# Patient Record
Sex: FEMALE | Race: BLACK OR AFRICAN AMERICAN | NOT HISPANIC OR LATINO | Employment: FULL TIME | URBAN - METROPOLITAN AREA
[De-identification: names, ages, dates, MRNs, and addresses within clinical notes are randomized per-mention and may not be internally consistent; named-entity substitution may affect disease eponyms.]

---

## 2017-07-31 ENCOUNTER — HISTORICAL (OUTPATIENT)
Dept: LAB | Facility: HOSPITAL | Age: 42
End: 2017-07-31

## 2017-07-31 LAB
FT4I SERPL CALC-MCNC: 8 (ref 3–4)
T3RU NFR SERPL: 41 % (ref 31–39)
T4 SERPL-MCNC: 20.3 MCG/DL (ref 4.7–13.3)
TSH SERPL-ACNC: <0.007 MIU/ML (ref 0.35–3.75)

## 2017-08-02 ENCOUNTER — HISTORICAL (OUTPATIENT)
Dept: RADIOLOGY | Facility: HOSPITAL | Age: 42
End: 2017-08-02

## 2017-11-06 ENCOUNTER — HISTORICAL (OUTPATIENT)
Dept: LAB | Facility: HOSPITAL | Age: 42
End: 2017-11-06

## 2017-11-06 LAB
FT4I SERPL CALC-MCNC: 3 (ref 3–4)
T3RU NFR SERPL: 36 % (ref 31–39)
T4 SERPL-MCNC: 9.6 MCG/DL (ref 4.7–13.3)
TSH SERPL-ACNC: <0.007 MIU/ML (ref 0.35–3.75)

## 2017-11-27 ENCOUNTER — HISTORICAL (OUTPATIENT)
Dept: LAB | Facility: HOSPITAL | Age: 42
End: 2017-11-27

## 2017-11-27 LAB — T4 FREE SERPL-MCNC: 1.45 NG/DL (ref 0.76–1.46)

## 2017-12-19 ENCOUNTER — HISTORICAL (OUTPATIENT)
Dept: ANESTHESIOLOGY | Facility: HOSPITAL | Age: 42
End: 2017-12-19

## 2017-12-19 LAB — B-HCG SERPL QL: NEGATIVE

## 2018-01-04 ENCOUNTER — HISTORICAL (OUTPATIENT)
Dept: LAB | Facility: HOSPITAL | Age: 43
End: 2018-01-04

## 2018-01-04 LAB
T3RU NFR SERPL: 31 % (ref 31–39)
T4 FREE SERPL-MCNC: 1.21 NG/DL (ref 0.76–1.46)
TSH SERPL-ACNC: <0.007 MIU/ML (ref 0.35–3.75)

## 2018-03-20 ENCOUNTER — HISTORICAL (OUTPATIENT)
Dept: RADIOLOGY | Facility: HOSPITAL | Age: 43
End: 2018-03-20

## 2018-03-27 ENCOUNTER — HISTORICAL (OUTPATIENT)
Dept: ADMINISTRATIVE | Facility: HOSPITAL | Age: 43
End: 2018-03-27

## 2018-03-27 LAB
ABS NEUT (OLG): 1.5 X10(3)/MCL (ref 2.1–9.2)
ALBUMIN SERPL-MCNC: 3.9 GM/DL (ref 3.4–5)
ALBUMIN/GLOB SERPL: 1 RATIO (ref 1–2)
ALP SERPL-CCNC: 88 UNIT/L (ref 45–117)
ALT SERPL-CCNC: 31 UNIT/L (ref 12–78)
APPEARANCE, UA: CLEAR
AST SERPL-CCNC: 17 UNIT/L (ref 15–37)
BACTERIA #/AREA URNS AUTO: ABNORMAL /[HPF]
BASOPHILS # BLD AUTO: 0.02 X10(3)/MCL
BASOPHILS NFR BLD AUTO: 0 %
BILIRUB SERPL-MCNC: 0.3 MG/DL (ref 0.2–1)
BILIRUB UR QL STRIP: NEGATIVE
BILIRUBIN DIRECT+TOT PNL SERPL-MCNC: 0.1 MG/DL
BILIRUBIN DIRECT+TOT PNL SERPL-MCNC: 0.2 MG/DL
BUN SERPL-MCNC: 17 MG/DL (ref 7–18)
CALCIUM SERPL-MCNC: 9.3 MG/DL (ref 8.5–10.1)
CHLORIDE SERPL-SCNC: 109 MMOL/L (ref 98–107)
CHOLEST SERPL-MCNC: 132 MG/DL
CHOLEST/HDLC SERPL: 2.6 {RATIO} (ref 0–4.4)
CO2 SERPL-SCNC: 24 MMOL/L (ref 21–32)
COLOR UR: YELLOW
CREAT SERPL-MCNC: 0.4 MG/DL (ref 0.6–1.3)
EOSINOPHIL # BLD AUTO: 0.27 X10(3)/MCL
EOSINOPHIL NFR BLD AUTO: 7 %
ERYTHROCYTE [DISTWIDTH] IN BLOOD BY AUTOMATED COUNT: 14.5 % (ref 11.5–14.5)
EST. AVERAGE GLUCOSE BLD GHB EST-MCNC: 108 MG/DL
GLOBULIN SER-MCNC: 3.9 GM/ML (ref 2.3–3.5)
GLUCOSE (UA): NORMAL
GLUCOSE SERPL-MCNC: 92 MG/DL (ref 74–106)
HAV IGM SERPL QL IA: NONREACTIVE
HBA1C MFR BLD: 5.4 % (ref 4.2–6.3)
HBV CORE IGM SERPL QL IA: NONREACTIVE
HBV SURFACE AG SERPL QL IA: NEGATIVE
HCT VFR BLD AUTO: 37.1 % (ref 35–46)
HCV AB SERPL QL IA: NONREACTIVE
HDLC SERPL-MCNC: 50 MG/DL
HGB BLD-MCNC: 11.9 GM/DL (ref 12–16)
HGB UR QL STRIP: NEGATIVE
HIV 1+2 AB+HIV1 P24 AG SERPL QL IA: NONREACTIVE
HYALINE CASTS #/AREA URNS LPF: 0 /[LPF]
IMM GRANULOCYTES # BLD AUTO: 0.01 10*3/UL
IMM GRANULOCYTES NFR BLD AUTO: 0 %
KETONES UR QL STRIP: NEGATIVE
LDLC SERPL CALC-MCNC: 68 MG/DL (ref 0–130)
LEUKOCYTE ESTERASE UR QL STRIP: 75 LEU/UL
LYMPHOCYTES # BLD AUTO: 1.72 X10(3)/MCL
LYMPHOCYTES NFR BLD AUTO: 45 % (ref 13–40)
MCH RBC QN AUTO: 26.6 PG (ref 26–34)
MCHC RBC AUTO-ENTMCNC: 32.1 GM/DL (ref 31–37)
MCV RBC AUTO: 83 FL (ref 80–100)
MONOCYTES # BLD AUTO: 0.32 X10(3)/MCL
MONOCYTES NFR BLD AUTO: 8 % (ref 4–12)
MUCOUS THREADS URNS QL MICRO: ABNORMAL
NEUTROPHILS # BLD AUTO: 1.5 X10(3)/MCL
NEUTROPHILS NFR BLD AUTO: 39 X10(3)/MCL
NITRITE UR QL STRIP: NEGATIVE
PH UR STRIP: 6.5 [PH] (ref 4.5–8)
PLATELET # BLD AUTO: 350 X10(3)/MCL (ref 130–400)
PMV BLD AUTO: 10.2 FL (ref 7.4–10.4)
POTASSIUM SERPL-SCNC: 3.5 MMOL/L (ref 3.5–5.1)
PROT SERPL-MCNC: 7.8 GM/DL (ref 6.4–8.2)
PROT UR QL STRIP: 20 MG/DL
RBC # BLD AUTO: 4.47 X10(6)/MCL (ref 4–5.2)
RBC #/AREA URNS AUTO: ABNORMAL /[HPF]
SODIUM SERPL-SCNC: 141 MMOL/L (ref 136–145)
SP GR UR STRIP: 1.03 (ref 1–1.03)
SQUAMOUS #/AREA URNS LPF: ABNORMAL /[LPF]
T3FREE SERPL-MCNC: 9.47 PG/ML (ref 2.18–3.98)
T4 FREE SERPL-MCNC: 2.2 NG/DL (ref 0.76–1.46)
TRIGL SERPL-MCNC: 72 MG/DL
TSH SERPL-ACNC: <0.005 MIU/L (ref 0.36–3.74)
UROBILINOGEN UR STRIP-ACNC: 2 MG/DL
VLDLC SERPL CALC-MCNC: 14 MG/DL
WBC # SPEC AUTO: 3.8 X10(3)/MCL (ref 4.5–11)
WBC #/AREA URNS AUTO: ABNORMAL /HPF

## 2018-04-10 ENCOUNTER — HISTORICAL (OUTPATIENT)
Dept: INTERNAL MEDICINE | Facility: CLINIC | Age: 43
End: 2018-04-10

## 2018-04-10 LAB
T4 FREE SERPL-MCNC: 1.91 NG/DL (ref 0.76–1.46)
TSH SERPL-ACNC: <0.005 MIU/L (ref 0.36–3.74)

## 2018-04-19 ENCOUNTER — HISTORICAL (OUTPATIENT)
Dept: INTERNAL MEDICINE | Facility: CLINIC | Age: 43
End: 2018-04-19

## 2018-04-24 ENCOUNTER — HISTORICAL (OUTPATIENT)
Dept: RADIOLOGY | Facility: HOSPITAL | Age: 43
End: 2018-04-24

## 2018-06-27 ENCOUNTER — HISTORICAL (OUTPATIENT)
Dept: ADMINISTRATIVE | Facility: HOSPITAL | Age: 43
End: 2018-06-27

## 2018-06-27 LAB
APTT PPP: 30.7 SECOND(S) (ref 23.3–37)
BUN SERPL-MCNC: 11 MG/DL (ref 7–18)
CALCIUM SERPL-MCNC: 9.4 MG/DL (ref 8.5–10.1)
CHLORIDE SERPL-SCNC: 105 MMOL/L (ref 98–107)
CO2 SERPL-SCNC: 26 MMOL/L (ref 21–32)
CREAT SERPL-MCNC: 0.5 MG/DL (ref 0.6–1.3)
CREAT/UREA NIT SERPL: 22
ERYTHROCYTE [DISTWIDTH] IN BLOOD BY AUTOMATED COUNT: 14.5 % (ref 11.5–14.5)
GLUCOSE SERPL-MCNC: 89 MG/DL (ref 74–106)
HCT VFR BLD AUTO: 36.7 % (ref 35–46)
HGB BLD-MCNC: 11.7 GM/DL (ref 12–16)
INR PPP: 1.06 (ref 0.9–1.2)
MCH RBC QN AUTO: 26.3 PG (ref 26–34)
MCHC RBC AUTO-ENTMCNC: 31.9 GM/DL (ref 31–37)
MCV RBC AUTO: 82.5 FL (ref 80–100)
PLATELET # BLD AUTO: 370 X10(3)/MCL (ref 130–400)
PMV BLD AUTO: 10.7 FL (ref 7.4–10.4)
POTASSIUM SERPL-SCNC: 3.6 MMOL/L (ref 3.5–5.1)
PROTHROMBIN TIME: 13.1 SECOND(S) (ref 11.9–14.4)
RBC # BLD AUTO: 4.45 X10(6)/MCL (ref 4–5.2)
SODIUM SERPL-SCNC: 140 MMOL/L (ref 136–145)
T4 FREE SERPL-MCNC: 1.61 NG/DL (ref 0.76–1.46)
TSH SERPL-ACNC: <0.005 MIU/L (ref 0.36–3.74)
WBC # SPEC AUTO: 3.9 X10(3)/MCL (ref 4.5–11)

## 2018-06-29 ENCOUNTER — HOSPITAL ENCOUNTER (OUTPATIENT)
Dept: MEDSURG UNIT | Facility: HOSPITAL | Age: 43
End: 2018-06-30
Attending: OTOLARYNGOLOGY | Admitting: OTOLARYNGOLOGY

## 2018-06-29 LAB
ALBUMIN SERPL-MCNC: 3.5 GM/DL (ref 3.4–5)
ALBUMIN SERPL-MCNC: 3.5 GM/DL (ref 3.4–5)
ALBUMIN SERPL-MCNC: 3.8 GM/DL (ref 3.4–5)
B-HCG SERPL QL: NEGATIVE
CALCIUM SERPL-MCNC: 9.1 MG/DL (ref 8.5–10.1)
CALCIUM SERPL-MCNC: 9.2 MG/DL (ref 8.5–10.1)
CALCIUM SERPL-MCNC: 9.3 MG/DL (ref 8.5–10.1)
PTH-INTACT SERPL-MCNC: 37.9 PG/ML (ref 18.4–80.1)

## 2018-06-30 LAB
ALBUMIN SERPL-MCNC: 3.4 GM/DL (ref 3.4–5)
ALBUMIN SERPL-MCNC: 3.7 GM/DL (ref 3.4–5)
ALBUMIN/GLOB SERPL: 1 RATIO (ref 1–2)
ALP SERPL-CCNC: 89 UNIT/L (ref 45–117)
ALT SERPL-CCNC: 23 UNIT/L (ref 12–78)
AST SERPL-CCNC: 15 UNIT/L (ref 15–37)
BILIRUB SERPL-MCNC: 0.2 MG/DL (ref 0.2–1)
BILIRUBIN DIRECT+TOT PNL SERPL-MCNC: 0.1 MG/DL
BILIRUBIN DIRECT+TOT PNL SERPL-MCNC: 0.1 MG/DL
BUN SERPL-MCNC: 10 MG/DL (ref 7–18)
CALCIUM SERPL-MCNC: 8.9 MG/DL (ref 8.5–10.1)
CALCIUM SERPL-MCNC: 9.2 MG/DL (ref 8.5–10.1)
CHLORIDE SERPL-SCNC: 107 MMOL/L (ref 98–107)
CO2 SERPL-SCNC: 22 MMOL/L (ref 21–32)
CREAT SERPL-MCNC: 0.6 MG/DL (ref 0.6–1.3)
GLOBULIN SER-MCNC: 3.6 GM/ML (ref 2.3–3.5)
GLUCOSE SERPL-MCNC: 125 MG/DL (ref 74–106)
POTASSIUM SERPL-SCNC: 4.4 MMOL/L (ref 3.5–5.1)
PROT SERPL-MCNC: 7.3 GM/DL (ref 6.4–8.2)
SODIUM SERPL-SCNC: 138 MMOL/L (ref 136–145)

## 2018-07-31 ENCOUNTER — HISTORICAL (OUTPATIENT)
Dept: ADMINISTRATIVE | Facility: HOSPITAL | Age: 43
End: 2018-07-31

## 2018-07-31 LAB
T4 FREE SERPL-MCNC: 1.53 NG/DL (ref 0.76–1.46)
TSH SERPL-ACNC: <0.005 MIU/L (ref 0.36–3.74)

## 2018-08-28 ENCOUNTER — HISTORICAL (OUTPATIENT)
Dept: LAB | Facility: HOSPITAL | Age: 43
End: 2018-08-28

## 2018-08-28 LAB
T4 SERPL-MCNC: 9.4 MCG/DL (ref 4.7–13.3)
TSH SERPL-ACNC: <0.007 MIU/ML (ref 0.35–3.75)

## 2018-09-28 ENCOUNTER — HISTORICAL (OUTPATIENT)
Dept: ADMINISTRATIVE | Facility: HOSPITAL | Age: 43
End: 2018-09-28

## 2018-09-28 LAB
ABS NEUT (OLG): 1.64 X10(3)/MCL (ref 2.1–9.2)
ALBUMIN SERPL-MCNC: 4 GM/DL (ref 3.4–5)
ALBUMIN/GLOB SERPL: 1 RATIO (ref 1–2)
ALP SERPL-CCNC: 129 UNIT/L (ref 45–117)
ALT SERPL-CCNC: 16 UNIT/L (ref 12–78)
ANISOCYTOSIS BLD QL SMEAR: ABNORMAL
AST SERPL-CCNC: 12 UNIT/L (ref 15–37)
BASOPHILS NFR BLD MANUAL: 0 %
BILIRUB SERPL-MCNC: 0.4 MG/DL (ref 0.2–1)
BILIRUBIN DIRECT+TOT PNL SERPL-MCNC: 0.1 MG/DL
BILIRUBIN DIRECT+TOT PNL SERPL-MCNC: 0.3 MG/DL
BUN SERPL-MCNC: 16 MG/DL (ref 7–18)
CALCIUM SERPL-MCNC: 9.2 MG/DL (ref 8.5–10.1)
CHLORIDE SERPL-SCNC: 108 MMOL/L (ref 98–107)
CO2 SERPL-SCNC: 26 MMOL/L (ref 21–32)
CREAT SERPL-MCNC: 0.8 MG/DL (ref 0.6–1.3)
EOSINOPHIL NFR BLD MANUAL: 0 %
ERYTHROCYTE [DISTWIDTH] IN BLOOD BY AUTOMATED COUNT: 15 % (ref 11.5–14.5)
GLOBULIN SER-MCNC: 4.6 GM/ML (ref 2.3–3.5)
GLUCOSE SERPL-MCNC: 84 MG/DL (ref 74–106)
GRANULOCYTES NFR BLD MANUAL: 26 % (ref 43–75)
HCT VFR BLD AUTO: 39.3 % (ref 35–46)
HGB BLD-MCNC: 12.3 GM/DL (ref 12–16)
LYMPHOCYTES NFR BLD MANUAL: 62 % (ref 20.5–51.1)
MCH RBC QN AUTO: 25.1 PG (ref 26–34)
MCHC RBC AUTO-ENTMCNC: 31.3 GM/DL (ref 31–37)
MCV RBC AUTO: 80.2 FL (ref 80–100)
METAMYELOCYTES NFR BLD MANUAL: 2 %
MONOCYTES NFR BLD MANUAL: 8 % (ref 2–9)
NEUTS BAND NFR BLD MANUAL: 2 % (ref 0–10)
PLATELET # BLD AUTO: 446 X10(3)/MCL (ref 130–400)
PLATELET # BLD EST: ABNORMAL 10*3/UL
PMV BLD AUTO: 10.4 FL (ref 7.4–10.4)
POTASSIUM SERPL-SCNC: 3.7 MMOL/L (ref 3.5–5.1)
PROT SERPL-MCNC: 8.6 GM/DL (ref 6.4–8.2)
RBC # BLD AUTO: 4.9 X10(6)/MCL (ref 4–5.2)
RBC MORPH BLD: ABNORMAL
SODIUM SERPL-SCNC: 139 MMOL/L (ref 136–145)
T4 FREE SERPL-MCNC: 1.02 NG/DL (ref 0.76–1.46)
TSH SERPL-ACNC: <0.005 MIU/L (ref 0.36–3.74)
WBC # SPEC AUTO: 4.8 X10(3)/MCL (ref 4.5–11)

## 2018-11-12 ENCOUNTER — HISTORICAL (OUTPATIENT)
Dept: LAB | Facility: HOSPITAL | Age: 43
End: 2018-11-12

## 2018-11-12 LAB
ABS NEUT (OLG): 2.27
ALBUMIN SERPL-MCNC: 3.7 GM/DL (ref 3.4–5)
ALBUMIN/GLOB SERPL: 0.9 RATIO (ref 1.1–2)
ALP SERPL-CCNC: 103 UNIT/L (ref 46–116)
ALT SERPL-CCNC: 19 UNIT/L (ref 12–78)
AST SERPL-CCNC: 11 UNIT/L (ref 10–37)
BASOPHILS # BLD AUTO: 0.05 X10(3)/MCL
BASOPHILS NFR BLD AUTO: 1 %
BILIRUB SERPL-MCNC: 0.2 MG/DL (ref 0.2–1)
BILIRUBIN DIRECT+TOT PNL SERPL-MCNC: 0.06 MG/DL (ref 0–0.2)
BILIRUBIN DIRECT+TOT PNL SERPL-MCNC: 0.14 MG/DL
BUN SERPL-MCNC: 19 MG/DL (ref 7–18)
CALCIUM SERPL-MCNC: 8.7 MG/DL (ref 8.5–10.1)
CHLORIDE SERPL-SCNC: 104 MMOL/L (ref 98–107)
CO2 SERPL-SCNC: 23.8 MMOL/L (ref 21–32)
CREAT SERPL-MCNC: 0.87 MG/DL (ref 0.55–1.02)
CRP SERPL-MCNC: <0.3 MG/DL
EOSINOPHIL # BLD AUTO: 0.13 X10(3)/MCL
EOSINOPHIL NFR BLD AUTO: 2.5 %
ERYTHROCYTE [DISTWIDTH] IN BLOOD BY AUTOMATED COUNT: 17 %
ERYTHROCYTE [SEDIMENTATION RATE] IN BLOOD: 26 MM/HR (ref 0–20)
FOLATE SERPL-MCNC: 14.3 NG/ML (ref 3.1–17.5)
GLOBULIN SER-MCNC: 4.1 GM/DL (ref 2.4–3.5)
GLUCOSE SERPL-MCNC: 85 MG/DL (ref 74–106)
HCT VFR BLD AUTO: 34.8 % (ref 34–46)
HGB BLD-MCNC: 11.2 GM/DL (ref 11.3–15.4)
IMM GRANULOCYTES # BLD AUTO: 0.01 10*3/UL (ref 0–0.1)
IMM GRANULOCYTES NFR BLD AUTO: 0.2 % (ref 0–1)
IRON SATN MFR SERPL: 6 % (ref 20–50)
IRON SERPL-MCNC: 25 MCG/DL (ref 50–175)
LYMPHOCYTES # BLD AUTO: 2.43 X10(3)/MCL
LYMPHOCYTES NFR BLD AUTO: 46.6 %
MCH RBC QN AUTO: 25.6 PG (ref 27–33)
MCHC RBC AUTO-ENTMCNC: 32.2 GM/DL (ref 32–35)
MCV RBC AUTO: 79.5 FL (ref 81–97)
MONOCYTES # BLD AUTO: 0.33 X10(3)/MCL
MONOCYTES NFR BLD AUTO: 6.3 %
NEUTROPHILS # BLD AUTO: 2.27 X10(3)/MCL
NEUTROPHILS NFR BLD AUTO: 43.4 %
PLATELET # BLD AUTO: 440 X10(3)/MCL (ref 151–368)
PMV BLD AUTO: 10 FL
POTASSIUM SERPL-SCNC: 3.5 MMOL/L (ref 3.5–5.1)
PROT SERPL-MCNC: 7.8 GM/DL (ref 6.4–8.2)
RBC # BLD AUTO: 4.38 X10(6)/MCL (ref 3.9–5)
SODIUM SERPL-SCNC: 139 MMOL/L (ref 136–145)
TIBC SERPL-MCNC: 420 MCG/DL (ref 250–450)
TRANSFERRIN SERPL-MCNC: 335 MG/DL (ref 200–360)
TSH SERPL-ACNC: 0.2 MIU/ML (ref 0.35–3.75)
WBC # SPEC AUTO: 5.22 X10(3)/MCL (ref 3.4–9.2)

## 2019-01-30 ENCOUNTER — HISTORICAL (OUTPATIENT)
Dept: LAB | Facility: HOSPITAL | Age: 44
End: 2019-01-30

## 2019-01-30 LAB
ABS NEUT (OLG): 2.01
BASOPHILS # BLD AUTO: 0.07 X10(3)/MCL
BASOPHILS NFR BLD AUTO: 1.5 %
EOSINOPHIL # BLD AUTO: 0.11 X10(3)/MCL
EOSINOPHIL NFR BLD AUTO: 2.3 %
ERYTHROCYTE [DISTWIDTH] IN BLOOD BY AUTOMATED COUNT: 16 %
HCT VFR BLD AUTO: 36 % (ref 34–46)
HGB BLD-MCNC: 11 GM/DL (ref 11.3–15.4)
IMM GRANULOCYTES # BLD AUTO: 0.01 10*3/UL (ref 0–0.1)
IMM GRANULOCYTES NFR BLD AUTO: 0.2 % (ref 0–1)
LYMPHOCYTES # BLD AUTO: 2.15 X10(3)/MCL
LYMPHOCYTES NFR BLD AUTO: 45.1 %
MCH RBC QN AUTO: 24.8 PG (ref 27–33)
MCHC RBC AUTO-ENTMCNC: 30.6 GM/DL (ref 32–35)
MCV RBC AUTO: 81.3 FL (ref 81–97)
MONOCYTES # BLD AUTO: 0.42 X10(3)/MCL
MONOCYTES NFR BLD AUTO: 8.8 %
NEUTROPHILS # BLD AUTO: 2.01 X10(3)/MCL
NEUTROPHILS NFR BLD AUTO: 42.1 %
PLATELET # BLD AUTO: 409 X10(3)/MCL (ref 151–368)
PMV BLD AUTO: 10 FL
RBC # BLD AUTO: 4.43 X10(6)/MCL (ref 3.9–5)
TSH SERPL-ACNC: 11.17 MIU/ML (ref 0.35–3.75)
WBC # SPEC AUTO: 4.77 X10(3)/MCL (ref 3.4–9.2)

## 2019-04-15 ENCOUNTER — HISTORICAL (OUTPATIENT)
Dept: LAB | Facility: HOSPITAL | Age: 44
End: 2019-04-15

## 2019-04-15 LAB
FT4I SERPL CALC-MCNC: 2 (ref 3–4)
T3RU NFR SERPL: 31 % (ref 31–39)
T4 SERPL-MCNC: 5.7 MCG/DL (ref 4.7–13.3)
TSH SERPL-ACNC: 17.87 MIU/ML (ref 0.35–3.75)

## 2019-07-01 ENCOUNTER — HISTORICAL (OUTPATIENT)
Dept: LAB | Facility: HOSPITAL | Age: 44
End: 2019-07-01

## 2019-07-01 LAB
FT4I SERPL CALC-MCNC: 0 (ref 3–4)
T3RU NFR SERPL: 26 % (ref 31–39)
T4 SERPL-MCNC: 1 MCG/DL (ref 4.7–13.3)
TSH SERPL-ACNC: 84.95 MIU/ML (ref 0.35–3.75)

## 2019-08-02 ENCOUNTER — HISTORICAL (OUTPATIENT)
Dept: ADMINISTRATIVE | Facility: HOSPITAL | Age: 44
End: 2019-08-02

## 2019-08-02 LAB
ALBUMIN SERPL-MCNC: 4.2 GM/DL (ref 3.4–5)
BUN SERPL-MCNC: 12 MG/DL (ref 7–18)
CALCIUM SERPL-MCNC: 9 MG/DL (ref 8.5–10.1)
CHLORIDE SERPL-SCNC: 108 MMOL/L (ref 98–107)
CO2 SERPL-SCNC: 26 MMOL/L (ref 21–32)
CREAT SERPL-MCNC: 1 MG/DL (ref 0.6–1.3)
DEPRECATED CALCIDIOL+CALCIFEROL SERPL-MC: 13.39 NG/ML (ref 30–80)
GLUCOSE SERPL-MCNC: 83 MG/DL (ref 74–106)
PHOSPHATE SERPL-MCNC: 2.7 MG/DL (ref 2.5–4.9)
POTASSIUM SERPL-SCNC: 3.7 MMOL/L (ref 3.5–5.1)
PTH-INTACT SERPL-MCNC: 144.3 PG/ML (ref 18.4–80.1)
SODIUM SERPL-SCNC: 140 MMOL/L (ref 136–145)
T4 FREE SERPL-MCNC: 0.37 NG/DL (ref 0.76–1.46)
TSH SERPL-ACNC: 83.6 MIU/L (ref 0.36–3.74)

## 2019-08-20 ENCOUNTER — HISTORICAL (OUTPATIENT)
Dept: RADIOLOGY | Facility: HOSPITAL | Age: 44
End: 2019-08-20

## 2019-08-28 ENCOUNTER — HISTORICAL (OUTPATIENT)
Dept: RADIOLOGY | Facility: HOSPITAL | Age: 44
End: 2019-08-28

## 2019-12-17 ENCOUNTER — HISTORICAL (OUTPATIENT)
Dept: ADMINISTRATIVE | Facility: HOSPITAL | Age: 44
End: 2019-12-17

## 2019-12-17 LAB
ABS NEUT (OLG): 2.1 X10(3)/MCL (ref 2.1–9.2)
BASOPHILS # BLD AUTO: 0.1 X10(3)/MCL (ref 0–0.2)
BASOPHILS NFR BLD AUTO: 2 %
DEPRECATED CALCIDIOL+CALCIFEROL SERPL-MC: 12.47 NG/ML (ref 30–80)
EOSINOPHIL # BLD AUTO: 0.1 X10(3)/MCL (ref 0–0.9)
EOSINOPHIL NFR BLD AUTO: 3 %
ERYTHROCYTE [DISTWIDTH] IN BLOOD BY AUTOMATED COUNT: 17.6 % (ref 11.5–14.5)
FOLATE SERPL-MCNC: 11.8 NG/ML (ref 3.1–17.5)
HCT VFR BLD AUTO: 35.4 % (ref 35–46)
HGB BLD-MCNC: 10.5 GM/DL (ref 12–16)
IMM GRANULOCYTES # BLD AUTO: 0.01 10*3/UL
IMM GRANULOCYTES NFR BLD AUTO: 0 %
LYMPHOCYTES # BLD AUTO: 2 X10(3)/MCL (ref 0.6–4.6)
LYMPHOCYTES NFR BLD AUTO: 43 %
MCH RBC QN AUTO: 24.5 PG (ref 26–34)
MCHC RBC AUTO-ENTMCNC: 29.7 GM/DL (ref 31–37)
MCV RBC AUTO: 82.5 FL (ref 80–100)
MONOCYTES # BLD AUTO: 0.4 X10(3)/MCL (ref 0.1–1.3)
MONOCYTES NFR BLD AUTO: 8 %
NEUTROPHILS # BLD AUTO: 2.1 X10(3)/MCL (ref 2.1–9.2)
NEUTROPHILS NFR BLD AUTO: 44 %
PLATELET # BLD AUTO: 518 X10(3)/MCL (ref 130–400)
PMV BLD AUTO: 9.8 FL (ref 7.4–10.4)
RBC # BLD AUTO: 4.29 X10(6)/MCL (ref 4–5.2)
T4 FREE SERPL-MCNC: 0.73 NG/DL (ref 0.76–1.46)
TSH SERPL-ACNC: 25.2 MIU/L (ref 0.36–3.74)
VIT B12 SERPL-MCNC: 411 PG/ML (ref 193–986)
WBC # SPEC AUTO: 4.8 X10(3)/MCL (ref 4.5–11)

## 2020-03-03 ENCOUNTER — HISTORICAL (OUTPATIENT)
Dept: LAB | Facility: HOSPITAL | Age: 45
End: 2020-03-03

## 2020-03-03 LAB
ABS NEUT (OLG): 1.55
ALBUMIN SERPL-MCNC: 3.6 GM/DL (ref 3.5–5.2)
ALBUMIN/GLOB SERPL: 1 RATIO (ref 1.1–2)
ALP SERPL-CCNC: 52 UNIT/L (ref 40–150)
ALT SERPL-CCNC: 12 UNIT/L (ref 0–55)
AST SERPL-CCNC: 11 UNIT/L (ref 5–34)
BASOPHILS # BLD AUTO: 0.05 X10(3)/MCL
BASOPHILS NFR BLD AUTO: 1.3 %
BILIRUB SERPL-MCNC: 0.3 MG/DL
BILIRUBIN DIRECT+TOT PNL SERPL-MCNC: 0.1 MG/DL (ref 0–0.5)
BILIRUBIN DIRECT+TOT PNL SERPL-MCNC: 0.2 MG/DL
BUN SERPL-MCNC: 11 MG/DL (ref 7–18.7)
CALCIUM SERPL-MCNC: 8.8 MG/DL (ref 8.4–10.2)
CHLORIDE SERPL-SCNC: 110 MMOL/L (ref 98–107)
CHOLEST SERPL-MCNC: 183 MG/DL
CHOLEST/HDLC SERPL: 4 {RATIO} (ref 0–5)
CO2 SERPL-SCNC: 25 MEQ/L (ref 22–29)
CREAT SERPL-MCNC: 0.66 MG/DL (ref 0.55–1.02)
EOSINOPHIL # BLD AUTO: 0.15 X10(3)/MCL
EOSINOPHIL NFR BLD AUTO: 4 %
ERYTHROCYTE [DISTWIDTH] IN BLOOD BY AUTOMATED COUNT: 16 %
GLOBULIN SER-MCNC: 3.6 GM/DL (ref 2.4–3.5)
GLUCOSE SERPL-MCNC: 91 MG/DL (ref 74–100)
HCT VFR BLD AUTO: 32.5 % (ref 34–46)
HDLC SERPL-MCNC: 49 MG/DL
HGB BLD-MCNC: 9.7 GM/DL (ref 11.3–15.4)
IMM GRANULOCYTES # BLD AUTO: 0.01 10*3/UL (ref 0–0.1)
IMM GRANULOCYTES NFR BLD AUTO: 0.3 % (ref 0–1)
LDLC SERPL CALC-MCNC: 118 MG/DL (ref 50–140)
LYMPHOCYTES # BLD AUTO: 1.6 X10(3)/MCL
LYMPHOCYTES NFR BLD AUTO: 42.6 %
MCH RBC QN AUTO: 23 PG (ref 27–33)
MCHC RBC AUTO-ENTMCNC: 29.8 GM/DL (ref 32–35)
MCV RBC AUTO: 77 FL (ref 81–97)
MONOCYTES # BLD AUTO: 0.4 X10(3)/MCL
MONOCYTES NFR BLD AUTO: 10.6 %
NEUTROPHILS # BLD AUTO: 1.55 X10(3)/MCL
NEUTROPHILS NFR BLD AUTO: 41.2 %
PLATELET # BLD AUTO: 432 X10(3)/MCL (ref 140–450)
PMV BLD AUTO: 11 FL
POTASSIUM SERPL-SCNC: 3.4 MMOL/L (ref 3.5–5.1)
PROT SERPL-MCNC: 7.2 GM/DL (ref 6.4–8.3)
RBC # BLD AUTO: 4.22 X10(6)/MCL (ref 3.9–5)
SODIUM SERPL-SCNC: 143 MMOL/L (ref 136–145)
TRIGL SERPL-MCNC: 80 MG/DL (ref 37–140)
TSH SERPL-ACNC: 0.31 UIU/ML (ref 0.35–4.94)
VLDLC SERPL CALC-MCNC: 16 MG/DL
WBC # SPEC AUTO: 3.76 X10(3)/MCL (ref 3.4–9.2)

## 2020-05-21 ENCOUNTER — HISTORICAL (OUTPATIENT)
Dept: LAB | Facility: HOSPITAL | Age: 45
End: 2020-05-21

## 2020-05-21 LAB — TSH SERPL-ACNC: 0.09 UIU/ML (ref 0.35–4.94)

## 2020-08-18 ENCOUNTER — HISTORICAL (OUTPATIENT)
Dept: LAB | Facility: HOSPITAL | Age: 45
End: 2020-08-18

## 2020-08-18 LAB
ABS NEUT (OLG): 2.08
ANISOCYTOSIS BLD QL SMEAR: ABNORMAL
BUN SERPL-MCNC: 15 MG/DL (ref 7–18.7)
CALCIUM SERPL-MCNC: 10.2 MG/DL (ref 8.4–10.2)
CHLORIDE SERPL-SCNC: 107 MMOL/L (ref 98–107)
CO2 SERPL-SCNC: 22 MEQ/L (ref 22–29)
CREAT SERPL-MCNC: 0.76 MG/DL (ref 0.55–1.02)
CREAT/UREA NIT SERPL: 20
EOSINOPHIL NFR BLD MANUAL: 2 % (ref 0–8)
ERYTHROCYTE [DISTWIDTH] IN BLOOD BY AUTOMATED COUNT: 19 %
GLUCOSE SERPL-MCNC: 80 MG/DL (ref 74–100)
GRANULOCYTES NFR BLD MANUAL: 40 % (ref 47–80)
HCT VFR BLD AUTO: 35.9 % (ref 34–46)
HGB BLD-MCNC: 11.2 GM/DL (ref 11.3–15.4)
HYPOCHROMIA BLD QL SMEAR: ABNORMAL
LYMPHOCYTES NFR BLD MANUAL: 49 % (ref 13–40)
MCH RBC QN AUTO: 23.2 PG (ref 27–33)
MCHC RBC AUTO-ENTMCNC: 31.2 GM/DL (ref 32–35)
MCV RBC AUTO: 74.5 FL (ref 81–97)
MICROCYTES BLD QL SMEAR: ABNORMAL
MONOCYTES NFR BLD MANUAL: 9 % (ref 2–11)
PLATELET # BLD AUTO: 541 X10(3)/MCL (ref 140–450)
PLATELET # BLD EST: ABNORMAL 10*3/UL
PMV BLD AUTO: 10 FL
POTASSIUM SERPL-SCNC: 4.4 MMOL/L (ref 3.5–5.1)
RBC # BLD AUTO: 4.82 X10(6)/MCL (ref 3.9–5)
SODIUM SERPL-SCNC: 142 MMOL/L (ref 136–145)
T4 FREE SERPL-MCNC: 0.67 NG/DL (ref 0.7–1.48)
TSH SERPL-ACNC: 0.22 UIU/ML (ref 0.35–4.94)
WBC # SPEC AUTO: 5.21 X10(3)/MCL (ref 3.4–9.2)

## 2020-09-16 ENCOUNTER — HISTORICAL (OUTPATIENT)
Dept: LAB | Facility: HOSPITAL | Age: 45
End: 2020-09-16

## 2020-09-16 LAB
ABS NEUT (OLG): 2.67
BASOPHILS # BLD AUTO: 0.07 X10(3)/MCL
BASOPHILS NFR BLD AUTO: 1.3 %
EOSINOPHIL # BLD AUTO: 0.23 X10(3)/MCL
EOSINOPHIL NFR BLD AUTO: 4.2 %
ERYTHROCYTE [DISTWIDTH] IN BLOOD BY AUTOMATED COUNT: 18 %
HCT VFR BLD AUTO: 31.8 % (ref 34–46)
HGB BLD-MCNC: 9.8 GM/DL (ref 11.3–15.4)
IMM GRANULOCYTES # BLD AUTO: 0.01 10*3/UL (ref 0–0.1)
IMM GRANULOCYTES NFR BLD AUTO: 0.2 % (ref 0–1)
LYMPHOCYTES # BLD AUTO: 2.05 X10(3)/MCL
LYMPHOCYTES NFR BLD AUTO: 37.4 %
MCH RBC QN AUTO: 23.6 PG (ref 27–33)
MCHC RBC AUTO-ENTMCNC: 30.8 GM/DL (ref 32–35)
MCV RBC AUTO: 76.6 FL (ref 81–97)
MONOCYTES # BLD AUTO: 0.45 X10(3)/MCL
MONOCYTES NFR BLD AUTO: 8.2 %
NEUTROPHILS # BLD AUTO: 2.67 X10(3)/MCL
NEUTROPHILS NFR BLD AUTO: 48.7 %
PLATELET # BLD AUTO: 477 X10(3)/MCL (ref 140–450)
PMV BLD AUTO: 10 FL
RBC # BLD AUTO: 4.15 X10(6)/MCL (ref 3.9–5)
T3RU NFR SERPL: 34.2 % (ref 31–39)
T4 FREE SERPL-MCNC: 1.22 NG/DL (ref 0.7–1.48)
TSH SERPL-ACNC: 0.02 UIU/ML (ref 0.35–4.94)
WBC # SPEC AUTO: 5.48 X10(3)/MCL (ref 3.4–9.2)

## 2020-09-21 ENCOUNTER — HISTORICAL (OUTPATIENT)
Dept: RADIOLOGY | Facility: HOSPITAL | Age: 45
End: 2020-09-21

## 2020-09-28 LAB
HIGH RISK HPV 16 (PRECISION): NEGATIVE
HIGH RISK HPV 18/45 (PRECISION): NORMAL
PAP RECOMMENDATION EXT: NORMAL
PAP SMEAR: NORMAL

## 2020-10-13 ENCOUNTER — HISTORICAL (OUTPATIENT)
Dept: ADMINISTRATIVE | Facility: HOSPITAL | Age: 45
End: 2020-10-13

## 2020-10-13 LAB
ABS NEUT (OLG): 1.6 X10(3)/MCL (ref 1.5–6.9)
ALBUMIN SERPL-MCNC: 3.8 GM/DL (ref 3.5–5)
ALBUMIN/GLOB SERPL: 1 RATIO (ref 1.1–2)
ALP SERPL-CCNC: 62 UNIT/L (ref 40–150)
ALT SERPL-CCNC: 7 UNIT/L (ref 0–55)
AST SERPL-CCNC: 14 UNIT/L (ref 5–34)
BASOPHILS NFR BLD MANUAL: 0 % (ref 0–1)
BILIRUB SERPL-MCNC: 0.2 MG/DL
BILIRUBIN DIRECT+TOT PNL SERPL-MCNC: 0.1 MG/DL (ref 0–0.5)
BILIRUBIN DIRECT+TOT PNL SERPL-MCNC: 0.1 MG/DL (ref 0–0.8)
BUN SERPL-MCNC: 11 MG/DL (ref 7–18.7)
CALCIUM SERPL-MCNC: 9.3 MG/DL (ref 8.4–10.2)
CHLORIDE SERPL-SCNC: 107 MMOL/L (ref 98–107)
CO2 SERPL-SCNC: 27 MMOL/L (ref 22–29)
CREAT SERPL-MCNC: 0.68 MG/DL (ref 0.55–1.02)
EOSINOPHIL NFR BLD MANUAL: 5 % (ref 0–5)
ERYTHROCYTE [DISTWIDTH] IN BLOOD BY AUTOMATED COUNT: 16.7 % (ref 11.5–17)
FERRITIN SERPL-MCNC: 4.67 NG/ML (ref 4.63–204)
GLOBULIN SER-MCNC: 3.9 GM/DL (ref 2.4–3.5)
GLUCOSE SERPL-MCNC: 87 MG/DL (ref 74–100)
GRANULOCYTES NFR BLD MANUAL: 41 % (ref 47–80)
HCT VFR BLD AUTO: 34.5 % (ref 36–48)
HGB BLD-MCNC: 10.3 GM/DL (ref 12–16)
HYPOCHROMIA BLD QL SMEAR: ABNORMAL
IRON SATN MFR SERPL: 5 % (ref 20–50)
IRON SERPL-MCNC: 19 UG/DL (ref 50–170)
LYMPHOCYTES NFR BLD MANUAL: 46 % (ref 15–40)
MCH RBC QN AUTO: 24 PG (ref 27–34)
MCHC RBC AUTO-ENTMCNC: 30 GM/DL (ref 31–36)
MCV RBC AUTO: 80 FL (ref 80–99)
MONOCYTES NFR BLD MANUAL: 8 % (ref 4–12)
PLATELET # BLD AUTO: 462 X10(3)/MCL (ref 140–400)
PLATELET # BLD EST: ABNORMAL 10*3/UL
PMV BLD AUTO: 9.7 FL (ref 6.8–10)
POTASSIUM SERPL-SCNC: 3.6 MMOL/L (ref 3.5–5.1)
PROT SERPL-MCNC: 7.7 GM/DL (ref 6.4–8.3)
RBC # BLD AUTO: 4.31 X10(6)/MCL (ref 4.2–5.4)
RBC MORPH BLD: ABNORMAL
SODIUM SERPL-SCNC: 141 MMOL/L (ref 136–145)
TIBC SERPL-MCNC: 355 UG/DL (ref 70–310)
TIBC SERPL-MCNC: 374 UG/DL (ref 250–450)
TSH SERPL-ACNC: 0.02 UIU/ML (ref 0.35–4.94)
WBC # SPEC AUTO: 3.9 X10(3)/MCL (ref 4.5–11.5)

## 2020-10-19 ENCOUNTER — HISTORICAL (OUTPATIENT)
Dept: INFUSION THERAPY | Facility: HOSPITAL | Age: 45
End: 2020-10-19

## 2020-10-26 ENCOUNTER — HISTORICAL (OUTPATIENT)
Dept: INFUSION THERAPY | Facility: HOSPITAL | Age: 45
End: 2020-10-26

## 2020-11-23 ENCOUNTER — HISTORICAL (OUTPATIENT)
Dept: RADIOLOGY | Facility: HOSPITAL | Age: 45
End: 2020-11-23

## 2020-12-04 ENCOUNTER — HISTORICAL (OUTPATIENT)
Dept: LAB | Facility: HOSPITAL | Age: 45
End: 2020-12-04

## 2020-12-04 LAB
ABS NEUT (OLG): 1.56
ALBUMIN SERPL-MCNC: 4.5 GM/DL (ref 3.5–5)
ALBUMIN/GLOB SERPL: 1.2 RATIO (ref 1.1–2)
ALP SERPL-CCNC: 60 UNIT/L (ref 40–150)
ALT SERPL-CCNC: 16 UNIT/L (ref 0–55)
AST SERPL-CCNC: 17 UNIT/L (ref 5–34)
BASOPHILS # BLD AUTO: 0.02 X10(3)/MCL
BASOPHILS NFR BLD AUTO: 0.5 %
BILIRUB SERPL-MCNC: 0.5 MG/DL
BILIRUBIN DIRECT+TOT PNL SERPL-MCNC: 0.2 MG/DL (ref 0–0.5)
BILIRUBIN DIRECT+TOT PNL SERPL-MCNC: 0.3 MG/DL
BUN SERPL-MCNC: 20 MG/DL (ref 7–18.7)
CALCIUM SERPL-MCNC: 9.7 MG/DL (ref 8.4–10.2)
CHLORIDE SERPL-SCNC: 106 MMOL/L (ref 98–107)
CO2 SERPL-SCNC: 23 MEQ/L (ref 22–29)
CREAT SERPL-MCNC: 0.81 MG/DL (ref 0.55–1.02)
EOSINOPHIL # BLD AUTO: 0.05 X10(3)/MCL
EOSINOPHIL NFR BLD AUTO: 1.4 %
ERYTHROCYTE [DISTWIDTH] IN BLOOD BY AUTOMATED COUNT: 21 %
GLOBULIN SER-MCNC: 3.8 GM/DL (ref 2.4–3.5)
GLUCOSE SERPL-MCNC: 84 MG/DL (ref 74–100)
HCT VFR BLD AUTO: 44.5 % (ref 34–46)
HGB BLD-MCNC: 14.8 GM/DL (ref 11.3–15.4)
IMM GRANULOCYTES # BLD AUTO: 0 10*3/UL (ref 0–0.1)
IMM GRANULOCYTES NFR BLD AUTO: 0 % (ref 0–1)
LYMPHOCYTES # BLD AUTO: 1.71 X10(3)/MCL
LYMPHOCYTES NFR BLD AUTO: 46.5 %
MCH RBC QN AUTO: 27.8 PG (ref 27–33)
MCHC RBC AUTO-ENTMCNC: 33.3 GM/DL (ref 32–35)
MCV RBC AUTO: 83.5 FL (ref 81–97)
MONOCYTES # BLD AUTO: 0.34 X10(3)/MCL
MONOCYTES NFR BLD AUTO: 9.2 %
NEUTROPHILS # BLD AUTO: 1.56 X10(3)/MCL
NEUTROPHILS NFR BLD AUTO: 42.4 %
PLATELET # BLD AUTO: 399 X10(3)/MCL (ref 140–450)
PMV BLD AUTO: 10 FL
POTASSIUM SERPL-SCNC: 3.6 MMOL/L (ref 3.5–5.1)
PROT SERPL-MCNC: 8.3 GM/DL (ref 6.4–8.3)
RBC # BLD AUTO: 5.33 X10(6)/MCL (ref 3.9–5)
SODIUM SERPL-SCNC: 139 MMOL/L (ref 136–145)
TSH SERPL-ACNC: 4.73 UIU/ML (ref 0.35–4.94)
WBC # SPEC AUTO: 3.68 X10(3)/MCL (ref 3.4–9.2)

## 2020-12-07 ENCOUNTER — HISTORICAL (OUTPATIENT)
Dept: LAB | Facility: HOSPITAL | Age: 45
End: 2020-12-07

## 2020-12-07 LAB
FERRITIN SERPL-MCNC: 96.81 NG/ML (ref 4.63–204)
IRON SATN MFR SERPL: 26 % (ref 20–50)
IRON SERPL-MCNC: 75 UG/DL (ref 50–170)
TIBC SERPL-MCNC: 218 UG/DL (ref 70–310)
TIBC SERPL-MCNC: 293 UG/DL (ref 250–450)
TRANSFERRIN SERPL-MCNC: 238 MG/DL (ref 180–382)

## 2021-03-10 ENCOUNTER — HISTORICAL (OUTPATIENT)
Dept: LAB | Facility: HOSPITAL | Age: 46
End: 2021-03-10

## 2021-03-10 LAB
ABS NEUT (OLG): 2.38
ALBUMIN SERPL-MCNC: 4.4 GM/DL (ref 3.5–5)
ALBUMIN/GLOB SERPL: 1.3 RATIO (ref 1.1–2)
ALP SERPL-CCNC: 60 UNIT/L (ref 40–150)
ALT SERPL-CCNC: 16 UNIT/L (ref 0–55)
AST SERPL-CCNC: 16 UNIT/L (ref 5–34)
BASOPHILS # BLD AUTO: 0.05 X10(3)/MCL
BASOPHILS NFR BLD AUTO: 1 %
BILIRUB SERPL-MCNC: 0.5 MG/DL
BILIRUBIN DIRECT+TOT PNL SERPL-MCNC: 0.2 MG/DL (ref 0–0.5)
BILIRUBIN DIRECT+TOT PNL SERPL-MCNC: 0.3 MG/DL
BUN SERPL-MCNC: 13 MG/DL (ref 7–18.7)
CALCIUM SERPL-MCNC: 9.7 MG/DL (ref 8.4–10.2)
CHLORIDE SERPL-SCNC: 107 MMOL/L (ref 98–107)
CO2 SERPL-SCNC: 25 MEQ/L (ref 22–29)
CREAT SERPL-MCNC: 0.81 MG/DL (ref 0.55–1.02)
EOSINOPHIL # BLD AUTO: 0.1 X10(3)/MCL
EOSINOPHIL NFR BLD AUTO: 2.1 %
ERYTHROCYTE [DISTWIDTH] IN BLOOD BY AUTOMATED COUNT: 14 %
FERRITIN SERPL-MCNC: 17.29 NG/ML (ref 4.63–204)
GLOBULIN SER-MCNC: 3.3 GM/DL (ref 2.4–3.5)
GLUCOSE SERPL-MCNC: 92 MG/DL (ref 74–100)
HCT VFR BLD AUTO: 41.9 % (ref 34–46)
HGB BLD-MCNC: 13.9 GM/DL (ref 11.3–15.4)
IMM GRANULOCYTES # BLD AUTO: 0.01 10*3/UL (ref 0–0.1)
IMM GRANULOCYTES NFR BLD AUTO: 0.2 % (ref 0–1)
IRON SATN MFR SERPL: 25 % (ref 20–50)
IRON SERPL-MCNC: 80 UG/DL (ref 50–170)
LYMPHOCYTES # BLD AUTO: 1.95 X10(3)/MCL
LYMPHOCYTES NFR BLD AUTO: 40.5 %
MCH RBC QN AUTO: 30.2 PG (ref 27–33)
MCHC RBC AUTO-ENTMCNC: 33.2 GM/DL (ref 32–35)
MCV RBC AUTO: 90.9 FL (ref 81–97)
MONOCYTES # BLD AUTO: 0.33 X10(3)/MCL
MONOCYTES NFR BLD AUTO: 6.8 %
NEUTROPHILS # BLD AUTO: 2.38 X10(3)/MCL
NEUTROPHILS NFR BLD AUTO: 49.4 %
PLATELET # BLD AUTO: 434 X10(3)/MCL (ref 140–450)
PMV BLD AUTO: 10 FL
POTASSIUM SERPL-SCNC: 3.5 MMOL/L (ref 3.5–5.1)
PROT SERPL-MCNC: 7.7 GM/DL (ref 6.4–8.3)
RBC # BLD AUTO: 4.61 X10(6)/MCL (ref 3.9–5)
SODIUM SERPL-SCNC: 139 MMOL/L (ref 136–145)
TIBC SERPL-MCNC: 238 UG/DL (ref 70–310)
TIBC SERPL-MCNC: 318 UG/DL (ref 250–450)
TRANSFERRIN SERPL-MCNC: 282 MG/DL (ref 180–382)
WBC # SPEC AUTO: 4.82 X10(3)/MCL (ref 3.4–9.2)

## 2021-05-14 ENCOUNTER — HISTORICAL (OUTPATIENT)
Dept: LAB | Facility: HOSPITAL | Age: 46
End: 2021-05-14

## 2021-05-14 LAB
ABS NEUT (OLG): 1.69
ALBUMIN SERPL-MCNC: 4.2 GM/DL (ref 3.5–5)
ALBUMIN/GLOB SERPL: 1.1 RATIO (ref 1.1–2)
ALP SERPL-CCNC: 56 UNIT/L (ref 40–150)
ALT SERPL-CCNC: 14 UNIT/L (ref 0–55)
ANISOCYTOSIS BLD QL SMEAR: SLIGHT
AST SERPL-CCNC: 20 UNIT/L (ref 5–34)
BASOPHILS NFR BLD MANUAL: 2 % (ref 0–2)
BILIRUB SERPL-MCNC: 0.2 MG/DL
BILIRUBIN DIRECT+TOT PNL SERPL-MCNC: 0.1 MG/DL
BILIRUBIN DIRECT+TOT PNL SERPL-MCNC: 0.1 MG/DL (ref 0–0.5)
BUN SERPL-MCNC: 13 MG/DL (ref 7–18.7)
CALCIUM SERPL-MCNC: 9.6 MG/DL (ref 8.4–10.2)
CHLORIDE SERPL-SCNC: 104 MMOL/L (ref 98–107)
CO2 SERPL-SCNC: 28 MEQ/L (ref 22–29)
CREAT SERPL-MCNC: 0.81 MG/DL (ref 0.55–1.02)
EOSINOPHIL NFR BLD MANUAL: 2 % (ref 0–8)
ERYTHROCYTE [DISTWIDTH] IN BLOOD BY AUTOMATED COUNT: 13 %
FERRITIN SERPL-MCNC: 10.37 NG/ML (ref 4.63–204)
GLOBULIN SER-MCNC: 3.8 GM/DL (ref 2.4–3.5)
GLUCOSE SERPL-MCNC: 96 MG/DL (ref 74–100)
GRANULOCYTES NFR BLD MANUAL: 43 % (ref 47–80)
HCT VFR BLD AUTO: 41.3 % (ref 34–46)
HGB BLD-MCNC: 13.6 GM/DL (ref 11.3–15.4)
HYPOCHROMIA BLD QL SMEAR: ABNORMAL
IRON SATN MFR SERPL: 21 % (ref 20–50)
IRON SERPL-MCNC: 73 UG/DL (ref 50–170)
LYMPHOCYTES NFR BLD MANUAL: 46 % (ref 13–40)
MCH RBC QN AUTO: 29.8 PG (ref 27–33)
MCHC RBC AUTO-ENTMCNC: 32.9 GM/DL (ref 32–35)
MCV RBC AUTO: 90.6 FL (ref 81–97)
MONOCYTES NFR BLD MANUAL: 7 % (ref 2–11)
PLATELET # BLD AUTO: 358 X10(3)/MCL (ref 140–450)
PLATELET # BLD EST: NORMAL 10*3/UL
PMV BLD AUTO: 10 FL
POTASSIUM SERPL-SCNC: 4.2 MMOL/L (ref 3.5–5.1)
PROT SERPL-MCNC: 8 GM/DL (ref 6.4–8.3)
RBC # BLD AUTO: 4.56 X10(6)/MCL (ref 3.9–5)
SODIUM SERPL-SCNC: 141 MMOL/L (ref 136–145)
TIBC SERPL-MCNC: 279 UG/DL (ref 70–310)
TIBC SERPL-MCNC: 352 UG/DL (ref 250–450)
TRANSFERRIN SERPL-MCNC: 307 MG/DL (ref 180–382)
WBC # SPEC AUTO: 4.35 X10(3)/MCL (ref 3.4–9.2)

## 2021-07-27 ENCOUNTER — HISTORICAL (OUTPATIENT)
Dept: ADMINISTRATIVE | Facility: HOSPITAL | Age: 46
End: 2021-07-27

## 2021-07-27 LAB
ABS NEUT (OLG): 2.77 X10(3)/MCL (ref 2.1–9.2)
ALBUMIN SERPL-MCNC: 4.1 GM/DL (ref 3.5–5)
ALBUMIN/GLOB SERPL: 1.3 RATIO (ref 1.1–2)
ALP SERPL-CCNC: 59 UNIT/L (ref 40–150)
ALT SERPL-CCNC: 13 UNIT/L (ref 0–55)
AST SERPL-CCNC: 16 UNIT/L (ref 5–34)
BASOPHILS # BLD AUTO: 0 X10(3)/MCL (ref 0–0.2)
BASOPHILS NFR BLD AUTO: 1 %
BILIRUB SERPL-MCNC: 0.3 MG/DL
BILIRUBIN DIRECT+TOT PNL SERPL-MCNC: 0.1 MG/DL (ref 0–0.5)
BILIRUBIN DIRECT+TOT PNL SERPL-MCNC: 0.2 MG/DL (ref 0–0.8)
BUN SERPL-MCNC: 14.3 MG/DL (ref 7–18.7)
CALCIUM SERPL-MCNC: 9.7 MG/DL (ref 8.4–10.2)
CHLORIDE SERPL-SCNC: 104 MMOL/L (ref 98–107)
CHOLEST SERPL-MCNC: 199 MG/DL
CHOLEST/HDLC SERPL: 4 {RATIO} (ref 0–5)
CO2 SERPL-SCNC: 27 MMOL/L (ref 22–29)
CREAT SERPL-MCNC: 0.71 MG/DL (ref 0.55–1.02)
EOSINOPHIL # BLD AUTO: 0.1 X10(3)/MCL (ref 0–0.9)
EOSINOPHIL NFR BLD AUTO: 2 %
ERYTHROCYTE [DISTWIDTH] IN BLOOD BY AUTOMATED COUNT: 13.6 % (ref 11.5–14.5)
EST. AVERAGE GLUCOSE BLD GHB EST-MCNC: 99.7 MG/DL
GLOBULIN SER-MCNC: 3.2 GM/DL (ref 2.4–3.5)
GLUCOSE SERPL-MCNC: 92 MG/DL (ref 74–100)
HBA1C MFR BLD: 5.1 %
HCT VFR BLD AUTO: 39 % (ref 35–46)
HDLC SERPL-MCNC: 55 MG/DL (ref 35–60)
HGB BLD-MCNC: 12.8 GM/DL (ref 12–16)
IMM GRANULOCYTES # BLD AUTO: 0.02 10*3/UL
IMM GRANULOCYTES NFR BLD AUTO: 0 %
LDLC SERPL CALC-MCNC: 122 MG/DL (ref 50–140)
LYMPHOCYTES # BLD AUTO: 2.3 X10(3)/MCL (ref 0.6–4.6)
LYMPHOCYTES NFR BLD AUTO: 39 %
MCH RBC QN AUTO: 30 PG (ref 26–34)
MCHC RBC AUTO-ENTMCNC: 32.8 GM/DL (ref 31–37)
MCV RBC AUTO: 91.5 FL (ref 80–100)
MONOCYTES # BLD AUTO: 0.6 X10(3)/MCL (ref 0.1–1.3)
MONOCYTES NFR BLD AUTO: 10 %
NEUTROPHILS # BLD AUTO: 2.77 X10(3)/MCL (ref 2.1–9.2)
NEUTROPHILS NFR BLD AUTO: 48 %
NRBC BLD AUTO-RTO: 0 % (ref 0–0.2)
PLATELET # BLD AUTO: 402 X10(3)/MCL (ref 130–400)
PMV BLD AUTO: 10 FL (ref 7.4–10.4)
POTASSIUM SERPL-SCNC: 4 MMOL/L (ref 3.5–5.1)
PROT SERPL-MCNC: 7.3 GM/DL (ref 6.4–8.3)
RBC # BLD AUTO: 4.26 X10(6)/MCL (ref 4–5.2)
SODIUM SERPL-SCNC: 139 MMOL/L (ref 136–145)
TRIGL SERPL-MCNC: 108 MG/DL (ref 37–140)
TSH SERPL-ACNC: 0.9 UIU/ML (ref 0.35–4.94)
VLDLC SERPL CALC-MCNC: 22 MG/DL
WBC # SPEC AUTO: 5.8 X10(3)/MCL (ref 4.5–11)

## 2021-08-25 ENCOUNTER — HISTORICAL (OUTPATIENT)
Dept: LAB | Facility: HOSPITAL | Age: 46
End: 2021-08-25

## 2021-08-25 LAB
ABS NEUT (OLG): 2.04
ALBUMIN SERPL-MCNC: 3.9 GM/DL (ref 3.5–5)
ALBUMIN/GLOB SERPL: 1.1 RATIO (ref 1.1–2)
ALP SERPL-CCNC: 59 UNIT/L (ref 40–150)
ALT SERPL-CCNC: 14 UNIT/L (ref 0–55)
AST SERPL-CCNC: 13 UNIT/L (ref 5–34)
BASOPHILS # BLD AUTO: 0.07 X10(3)/MCL
BASOPHILS NFR BLD AUTO: 1.5 %
BILIRUB SERPL-MCNC: 0.5 MG/DL
BILIRUBIN DIRECT+TOT PNL SERPL-MCNC: 0.2 MG/DL (ref 0–0.5)
BILIRUBIN DIRECT+TOT PNL SERPL-MCNC: 0.3 MG/DL
BUN SERPL-MCNC: 17 MG/DL (ref 7–18.7)
CALCIUM SERPL-MCNC: 9.6 MG/DL (ref 8.4–10.2)
CHLORIDE SERPL-SCNC: 106 MMOL/L (ref 98–107)
CO2 SERPL-SCNC: 24 MEQ/L (ref 22–29)
CREAT SERPL-MCNC: 0.76 MG/DL (ref 0.55–1.02)
EOSINOPHIL # BLD AUTO: 0.1 X10(3)/MCL
EOSINOPHIL NFR BLD AUTO: 2.2 %
ERYTHROCYTE [DISTWIDTH] IN BLOOD BY AUTOMATED COUNT: 14 %
FERRITIN SERPL-MCNC: 15.88 NG/ML (ref 4.63–204)
GLOBULIN SER-MCNC: 3.6 GM/DL (ref 2.4–3.5)
GLUCOSE SERPL-MCNC: 104 MG/DL (ref 74–100)
HCT VFR BLD AUTO: 37.2 % (ref 34–46)
HGB BLD-MCNC: 12 GM/DL (ref 11.3–15.4)
IMM GRANULOCYTES # BLD AUTO: 0.01 10*3/UL (ref 0–0.1)
IMM GRANULOCYTES NFR BLD AUTO: 0.2 % (ref 0–1)
IRON SATN MFR SERPL: 17 % (ref 20–50)
IRON SERPL-MCNC: 58 UG/DL (ref 50–170)
LYMPHOCYTES # BLD AUTO: 1.97 X10(3)/MCL
LYMPHOCYTES NFR BLD AUTO: 43.5 %
MCH RBC QN AUTO: 28.9 PG (ref 27–33)
MCHC RBC AUTO-ENTMCNC: 32.3 GM/DL (ref 32–35)
MCV RBC AUTO: 89.6 FL (ref 81–97)
MONOCYTES # BLD AUTO: 0.34 X10(3)/MCL
MONOCYTES NFR BLD AUTO: 7.5 %
NEUTROPHILS # BLD AUTO: 2.04 X10(3)/MCL
NEUTROPHILS NFR BLD AUTO: 45.1 %
PLATELET # BLD AUTO: 529 X10(3)/MCL (ref 140–450)
PMV BLD AUTO: 9 FL
POTASSIUM SERPL-SCNC: 3.6 MMOL/L (ref 3.5–5.1)
PROT SERPL-MCNC: 7.5 GM/DL (ref 6.4–8.3)
RBC # BLD AUTO: 4.15 X10(6)/MCL (ref 3.9–5)
SODIUM SERPL-SCNC: 140 MMOL/L (ref 136–145)
TIBC SERPL-MCNC: 274 UG/DL (ref 70–310)
TIBC SERPL-MCNC: 332 UG/DL (ref 250–450)
TRANSFERRIN SERPL-MCNC: 310 MG/DL (ref 180–382)
WBC # SPEC AUTO: 4.53 X10(3)/MCL (ref 3.4–9.2)

## 2021-09-23 ENCOUNTER — HISTORICAL (OUTPATIENT)
Dept: INFUSION THERAPY | Facility: HOSPITAL | Age: 46
End: 2021-09-23

## 2021-09-30 ENCOUNTER — HISTORICAL (OUTPATIENT)
Dept: INFUSION THERAPY | Facility: HOSPITAL | Age: 46
End: 2021-09-30

## 2021-12-10 ENCOUNTER — HISTORICAL (OUTPATIENT)
Dept: ADMINISTRATIVE | Facility: HOSPITAL | Age: 46
End: 2021-12-10

## 2021-12-10 LAB
ABS NEUT (OLG): 1.66 X10(3)/MCL (ref 2.1–9.2)
ALBUMIN SERPL-MCNC: 4.7 GM/DL (ref 3.5–5)
ALBUMIN/GLOB SERPL: 1.1 RATIO (ref 1.1–2)
ALP SERPL-CCNC: 68 UNIT/L (ref 40–150)
ALT SERPL-CCNC: 15 UNIT/L (ref 0–55)
AST SERPL-CCNC: 16 UNIT/L (ref 5–34)
BASOPHILS # BLD AUTO: 0 X10(3)/MCL (ref 0–0.2)
BASOPHILS NFR BLD AUTO: 1 %
BILIRUB SERPL-MCNC: 0.4 MG/DL
BILIRUBIN DIRECT+TOT PNL SERPL-MCNC: 0.2 MG/DL (ref 0–0.5)
BILIRUBIN DIRECT+TOT PNL SERPL-MCNC: 0.2 MG/DL (ref 0–0.8)
BUN SERPL-MCNC: 10.8 MG/DL (ref 7–18.7)
CALCIUM SERPL-MCNC: 10.3 MG/DL (ref 8.7–10.5)
CHLORIDE SERPL-SCNC: 105 MMOL/L (ref 98–107)
CO2 SERPL-SCNC: 27 MMOL/L (ref 22–29)
CREAT SERPL-MCNC: 0.78 MG/DL (ref 0.55–1.02)
EOSINOPHIL # BLD AUTO: 0.1 X10(3)/MCL (ref 0–0.9)
EOSINOPHIL NFR BLD AUTO: 4 %
ERYTHROCYTE [DISTWIDTH] IN BLOOD BY AUTOMATED COUNT: 14.5 % (ref 11.5–14.5)
FERRITIN SERPL-MCNC: 58.24 NG/ML (ref 4.63–204)
GLOBULIN SER-MCNC: 4.2 GM/DL (ref 2.4–3.5)
GLUCOSE SERPL-MCNC: 84 MG/DL (ref 74–100)
HCT VFR BLD AUTO: 45 % (ref 35–46)
HGB BLD-MCNC: 15 GM/DL (ref 12–16)
IMM GRANULOCYTES # BLD AUTO: 0.01 10*3/UL
IMM GRANULOCYTES NFR BLD AUTO: 0 %
IRON SATN MFR SERPL: 19 % (ref 20–50)
IRON SERPL-MCNC: 62 UG/DL (ref 50–170)
LYMPHOCYTES # BLD AUTO: 1.3 X10(3)/MCL (ref 0.6–4.6)
LYMPHOCYTES NFR BLD AUTO: 39 %
MCH RBC QN AUTO: 31 PG (ref 26–34)
MCHC RBC AUTO-ENTMCNC: 33.3 GM/DL (ref 31–37)
MCV RBC AUTO: 93 FL (ref 80–100)
MONOCYTES # BLD AUTO: 0.2 X10(3)/MCL (ref 0.1–1.3)
MONOCYTES NFR BLD AUTO: 7 %
NEUTROPHILS # BLD AUTO: 1.66 X10(3)/MCL (ref 2.1–9.2)
NEUTROPHILS NFR BLD AUTO: 50 %
NRBC BLD AUTO-RTO: 0 % (ref 0–0.2)
PLATELET # BLD AUTO: 418 X10(3)/MCL (ref 130–400)
PMV BLD AUTO: 10.1 FL (ref 7.4–10.4)
POC BETA-HCG (QUAL): NEGATIVE
POTASSIUM SERPL-SCNC: 4.4 MMOL/L (ref 3.5–5.1)
PROT SERPL-MCNC: 8.9 GM/DL (ref 6.4–8.3)
RBC # BLD AUTO: 4.84 X10(6)/MCL (ref 4–5.2)
SODIUM SERPL-SCNC: 139 MMOL/L (ref 136–145)
TIBC SERPL-MCNC: 268 UG/DL (ref 70–310)
TIBC SERPL-MCNC: 330 UG/DL (ref 250–450)
TRANSFERRIN SERPL-MCNC: 295 MG/DL (ref 180–382)
TSH SERPL-ACNC: 2.03 UIU/ML (ref 0.35–4.94)
WBC # SPEC AUTO: 3.3 X10(3)/MCL (ref 4.5–11)

## 2022-01-11 ENCOUNTER — HISTORICAL (OUTPATIENT)
Dept: LAB | Facility: HOSPITAL | Age: 47
End: 2022-01-11

## 2022-01-11 LAB
ABS NEUT (OLG): 0.89
ALBUMIN SERPL-MCNC: 4 GM/DL (ref 3.5–5)
ALBUMIN/GLOB SERPL: 1.2 RATIO (ref 1.1–2)
ALP SERPL-CCNC: 57 UNIT/L (ref 40–150)
ALT SERPL-CCNC: 10 UNIT/L (ref 0–55)
AST SERPL-CCNC: 15 UNIT/L (ref 5–34)
BILIRUB SERPL-MCNC: 0.2 MG/DL
BILIRUBIN DIRECT+TOT PNL SERPL-MCNC: 0.1 MG/DL
BILIRUBIN DIRECT+TOT PNL SERPL-MCNC: 0.1 MG/DL (ref 0–0.5)
BUN SERPL-MCNC: 9 MG/DL (ref 7–18.7)
CALCIUM SERPL-MCNC: 9.3 MG/DL (ref 8.7–10.5)
CHLORIDE SERPL-SCNC: 107 MMOL/L (ref 98–107)
CO2 SERPL-SCNC: 27 MEQ/L (ref 22–29)
CREAT SERPL-MCNC: 0.79 MG/DL (ref 0.55–1.02)
EOSINOPHIL NFR BLD MANUAL: 2 % (ref 0–8)
ERYTHROCYTE [DISTWIDTH] IN BLOOD BY AUTOMATED COUNT: 13 %
FERRITIN SERPL-MCNC: 65.4 NG/ML (ref 4.63–204)
GLOBULIN SER-MCNC: 3.4 GM/DL (ref 2.4–3.5)
GLUCOSE SERPL-MCNC: 97 MG/DL (ref 74–100)
GRANULOCYTES NFR BLD MANUAL: 40 % (ref 47–80)
HCT VFR BLD AUTO: 40.8 % (ref 34–46)
HGB BLD-MCNC: 13.5 GM/DL (ref 11.3–15.4)
HYPOCHROMIA BLD QL SMEAR: SLIGHT
IRON SATN MFR SERPL: 21 % (ref 20–50)
IRON SERPL-MCNC: 55 UG/DL (ref 50–170)
LYMPHOCYTES NFR BLD MANUAL: 50 % (ref 13–40)
MCH RBC QN AUTO: 29.8 PG (ref 27–33)
MCHC RBC AUTO-ENTMCNC: 33.1 GM/DL (ref 32–35)
MCV RBC AUTO: 90.1 FL (ref 81–97)
MONOCYTES NFR BLD MANUAL: 8 % (ref 2–11)
PLATELET # BLD AUTO: 406 X10(3)/MCL (ref 140–450)
PLATELET # BLD EST: ADEQUATE 10*3/UL
PMV BLD AUTO: 10 FL
POTASSIUM SERPL-SCNC: 3.8 MMOL/L (ref 3.5–5.1)
PROT SERPL-MCNC: 7.4 GM/DL (ref 6.4–8.3)
RBC # BLD AUTO: 4.53 X10(6)/MCL (ref 3.9–5)
SODIUM SERPL-SCNC: 142 MMOL/L (ref 136–145)
TIBC SERPL-MCNC: 210 UG/DL (ref 70–310)
TIBC SERPL-MCNC: 265 UG/DL (ref 250–450)
TRANSFERRIN SERPL-MCNC: 235 MG/DL (ref 180–382)
WBC # SPEC AUTO: 2.44 X10(3)/MCL (ref 3.4–9.2)

## 2022-02-10 ENCOUNTER — HISTORICAL (OUTPATIENT)
Dept: LAB | Facility: HOSPITAL | Age: 47
End: 2022-02-10

## 2022-02-10 ENCOUNTER — HISTORICAL (OUTPATIENT)
Dept: ADMINISTRATIVE | Facility: HOSPITAL | Age: 47
End: 2022-02-10

## 2022-02-10 LAB
ABS NEUT (OLG): 1.71
ALBUMIN SERPL-MCNC: 4.1 G/DL (ref 3.5–5)
ALBUMIN/GLOB SERPL: 1.2 {RATIO} (ref 1.1–2)
ALP SERPL-CCNC: 59 U/L (ref 40–150)
ALT SERPL-CCNC: 16 U/L (ref 0–55)
AST SERPL-CCNC: 16 U/L (ref 5–34)
BASOPHILS # BLD AUTO: 0.03 10*3/UL
BASOPHILS NFR BLD AUTO: 0.8 %
BILIRUB SERPL-MCNC: 0.4 MG/DL
BILIRUBIN DIRECT+TOT PNL SERPL-MCNC: 0.2
BILIRUBIN DIRECT+TOT PNL SERPL-MCNC: 0.2 (ref 0–0.5)
BUN SERPL-MCNC: 11 MG/DL (ref 7–18.7)
CALCIUM SERPL-MCNC: 10 MG/DL (ref 8.7–10.5)
CHLORIDE SERPL-SCNC: 106 MMOL/L (ref 98–107)
CHOLEST SERPL-MCNC: 201 MG/DL
CHOLEST/HDLC SERPL: 4 {RATIO} (ref 0–5)
CO2 SERPL-SCNC: 29 MMOL/L (ref 22–29)
CREAT SERPL-MCNC: 0.7 MG/DL (ref 0.55–1.02)
EOSINOPHIL # BLD AUTO: 0.11 10*3/UL
EOSINOPHIL NFR BLD AUTO: 3 %
ERYTHROCYTE [DISTWIDTH] IN BLOOD BY AUTOMATED COUNT: 13 %
GLOBULIN SER-MCNC: 3.4 G/DL (ref 2.4–3.5)
GLUCOSE SERPL-MCNC: 83 MG/DL (ref 74–100)
HCT VFR BLD AUTO: 38.4 % (ref 34–46)
HDLC SERPL-MCNC: 53 MG/DL (ref 35–60)
HEMOLYSIS INTERF INDEX SERPL-ACNC: -1
HGB BLD-MCNC: 12.8 G/DL (ref 11.3–15.4)
ICTERIC INTERF INDEX SERPL-ACNC: 0
IMM GRANULOCYTES # BLD AUTO: 0.01 10*3/UL (ref 0–0.1)
IMM GRANULOCYTES NFR BLD AUTO: 0.3 % (ref 0–1)
LDLC SERPL CALC-MCNC: 135 MG/DL (ref 50–140)
LIPEMIC INTERF INDEX SERPL-ACNC: 1
LYMPHOCYTES # BLD AUTO: 1.52 10*3/UL
LYMPHOCYTES NFR BLD AUTO: 41.5 %
MANUAL DIFF? (OHS): NO
MCH RBC QN AUTO: 30.5 PG (ref 27–33)
MCHC RBC AUTO-ENTMCNC: 33.3 G/DL (ref 32–35)
MCV RBC AUTO: 91.4 FL (ref 81–97)
MONOCYTES # BLD AUTO: 0.28 10*3/UL
MONOCYTES NFR BLD AUTO: 7.7 %
NEUTROPHILS # BLD AUTO: 1.71 10*3/UL
NEUTROPHILS NFR BLD AUTO: 46.7 %
PLATELET # BLD AUTO: 419 10*3/UL (ref 140–450)
PMV BLD AUTO: 10 FL
POTASSIUM SERPL-SCNC: 4 MMOL/L (ref 3.5–5.1)
PROT SERPL-MCNC: 7.5 G/DL (ref 6.4–8.3)
RBC # BLD AUTO: 4.2 10*6/UL (ref 3.9–5)
SODIUM SERPL-SCNC: 142 MMOL/L (ref 136–145)
T3RU NFR SERPL: 33.12 % (ref 31–39)
T4 SERPL-MCNC: 8.25 UG/DL (ref 4.87–11.72)
TRIGL SERPL-MCNC: 64 MG/DL (ref 37–140)
TSH SERPL-ACNC: 1.34 M[IU]/L (ref 0.35–4.94)
VLDLC SERPL CALC-MCNC: 13 MG/DL
WBC # SPEC AUTO: 3.66 10*3/UL (ref 3.4–9.2)

## 2022-02-14 ENCOUNTER — HISTORICAL (OUTPATIENT)
Dept: ADMINISTRATIVE | Facility: HOSPITAL | Age: 47
End: 2022-02-14

## 2022-02-14 ENCOUNTER — HISTORICAL (OUTPATIENT)
Dept: RADIOLOGY | Facility: HOSPITAL | Age: 47
End: 2022-02-14

## 2022-04-08 ENCOUNTER — HISTORICAL (OUTPATIENT)
Dept: LAB | Facility: HOSPITAL | Age: 47
End: 2022-04-08

## 2022-04-08 LAB
ABS NEUT (OLG): 1.24
ALBUMIN SERPL-MCNC: 4.2 G/DL (ref 3.5–5)
ALBUMIN/GLOB SERPL: 1.2 {RATIO} (ref 1.1–2)
ALP SERPL-CCNC: 63 U/L (ref 40–150)
ALT SERPL-CCNC: 13 U/L (ref 0–55)
AST SERPL-CCNC: 15 U/L (ref 5–34)
BASOPHILS NFR BLD MANUAL: 2 % (ref 0–2)
BILIRUB SERPL-MCNC: 0.5 MG/DL
BILIRUBIN DIRECT+TOT PNL SERPL-MCNC: 0.2 (ref 0–0.5)
BILIRUBIN DIRECT+TOT PNL SERPL-MCNC: 0.3
BUN SERPL-MCNC: 10 MG/DL (ref 7–18.7)
CALCIUM SERPL-MCNC: 9.2 MG/DL (ref 8.7–10.5)
CHLORIDE SERPL-SCNC: 108 MMOL/L (ref 98–107)
CO2 SERPL-SCNC: 23 MMOL/L (ref 22–29)
CREAT SERPL-MCNC: 0.73 MG/DL (ref 0.55–1.02)
EOSINOPHIL NFR BLD MANUAL: 2 % (ref 0–8)
ERYTHROCYTE [DISTWIDTH] IN BLOOD BY AUTOMATED COUNT: 13 %
GLOBULIN SER-MCNC: 3.5 G/DL (ref 2.4–3.5)
GLUCOSE SERPL-MCNC: 87 MG/DL (ref 74–100)
GRANULOCYTES NFR BLD MANUAL: 40 % (ref 47–80)
HCT VFR BLD AUTO: 39.8 % (ref 34–46)
HEMOLYSIS INTERF INDEX SERPL-ACNC: 2
HGB BLD-MCNC: 13.5 G/DL (ref 11.3–15.4)
ICTERIC INTERF INDEX SERPL-ACNC: 0
LIPEMIC INTERF INDEX SERPL-ACNC: 1
LYMPHOCYTES NFR BLD MANUAL: 48 % (ref 13–40)
MANUAL DIFF? (OHS): YES
MCH RBC QN AUTO: 29.9 PG (ref 27–33)
MCHC RBC AUTO-ENTMCNC: 33.9 G/DL (ref 32–35)
MCV RBC AUTO: 88.2 FL (ref 81–97)
MONOCYTES NFR BLD MANUAL: 8 % (ref 2–11)
PLATELET # BLD AUTO: 415 10*3/UL (ref 140–450)
PLATELET # BLD EST: ADEQUATE 10*3/UL
PMV BLD AUTO: 10 FL
POTASSIUM SERPL-SCNC: 3.7 MMOL/L (ref 3.5–5.1)
PROT SERPL-MCNC: 7.7 G/DL (ref 6.4–8.3)
RBC # BLD AUTO: 4.51 10*6/UL (ref 3.9–5)
SODIUM SERPL-SCNC: 142 MMOL/L (ref 136–145)
WBC # SPEC AUTO: 3.2 10*3/UL (ref 3.4–9.2)

## 2022-04-09 LAB
FERRITIN SERPL-MCNC: 39.56 NG/ML (ref 4.63–204)
IRON SATN MFR SERPL: 30 % (ref 20–50)
IRON SERPL-MCNC: 80 UG/DL (ref 50–170)
TIBC SERPL-MCNC: 187 UG/DL (ref 70–310)
TIBC SERPL-MCNC: 267 UG/DL (ref 250–450)
TRANSFERRIN SERPL-MCNC: 249 MG/DL (ref 180–382)

## 2022-04-11 ENCOUNTER — HISTORICAL (OUTPATIENT)
Dept: ADMINISTRATIVE | Facility: HOSPITAL | Age: 47
End: 2022-04-11
Payer: MEDICAID

## 2022-04-27 VITALS
DIASTOLIC BLOOD PRESSURE: 101 MMHG | WEIGHT: 221.31 LBS | OXYGEN SATURATION: 97 % | HEIGHT: 65 IN | BODY MASS INDEX: 36.87 KG/M2 | SYSTOLIC BLOOD PRESSURE: 139 MMHG

## 2022-04-30 NOTE — PROGRESS NOTES
Patient:   Graham Frederick             MRN: [id number]            FIN: [id number]               Age:   44 years     Sex:  Female     :  1975   Associated Diagnoses:   None   Author:   Sunni YOUNG, Regi Blackburn reviewed: Will discuss with patient during follow up appointment on  Mar. 25, 2020 at 2:40pm.

## 2022-04-30 NOTE — OP NOTE
Patient:   Graham Frederick             MRN: [id number]            FIN: [id number]               Age:   42 years     Sex:  Female     :  1975   Associated Diagnoses:   None   Author:   Radha Hicks MD       DATE OF SURGERY:   18      ATTENDING PHYSICIAN:  Schuyler Mendes MD     PREOPERATIVE DIAGNOSIS:    Graves Disease   Thyroid nodule     POSTOPERATIVE DIAGNOSIS:     Graves Disease   Thyroid nodule     PROCEDURE PERFORMED:  Total thyroidectomy     ANESTHESIA:  General endotracheal anesthesia.     ESTIMATED BLOOD LOSS:  30 mL.     COMPLICATIONS:  None.     SPECIMENS: right and left thyroid lobe.     INDICATION FOR PROCEDURE:  42yF with multiple thyroid nodules and a h/o Graves disease. Discussed options of continuing medical management vs. SEPULVEDA vs. Total thyroidectomy. Patient elected to proceed with surgery.      PROCEDURE IN DETAIL:  After appropriate witnessed informed consent was obtained, the patient was taken down to the operating room and laid in the supine position.  General endotracheal anesthesia was induced without complications and a NIM tube was placed for NIM monitoring.  Patient was positioned and placed in extension.  A skin crease was identified approximately 1 fingerbreadth above the sternal notch.  A 4 cm incision was designed in the anterior neck skin crease using her previous incision.  This was then infiltrated with 1% lidocaine with 1:100,000 epinephrine.  A total of 3 mL was utilized.  Before proceeding any further, a full timeout was performed identifying the correct patient and operative site.  The neck was then prepped and draped in the usual sterile fashion.      Before starting, a tap test was performed identifying a functional NIM monitoring system.  Incision was made through the skin and subcutaneous tissue with a #10 blade.  The incision was then continued down inferiorly using the Bovie electrocautery until the investing layer of deep cervical fascia was encountered.   A superior skin flap was raised up to the level of the thyroid notch and an inferior skin flap was raised down to the level of the clavicles and the sternal head.  At this point in time, the Divya retractors were used for self-retention retraction.  The midline raphe was then identified and strap muscles were divided.  The incision carried down onto the isthmus of the thyroid.  The trachea was identified superiorly and inferiorly.  Dissection then continued on the thyroid capsule itself and the strap muscles were elevated out laterally.  Once adequate elevation of the strap muscles was achieved, we turned our attention to the superior pole.  There were 2 superior pole vessels emanating laterally and superiorly.  The harmonic scalpel was used to take down these vessels.  Blunt dissection then carried down inferiorly where the capsule was  from the thyroid parenchyma itself.  At this point, the thyroid gland was retracted medially and the capsule was further  from the surrounding tissue.  The carotid was identified in the lateral neck.  A few spreads were made within the Perkins and the recurrent laryngeal nerve was identified near the cricothyroid joint.  This was dissected both superiorly and inferiorly, and protected.  The thyroid was then  from the recurrent laryngeal nerve using a Perkins dissector.  The Prass probe was used to confirm the integrity of the recurrent laryngeal nerve.  Dissection continued down inferiorly where the inferior pole was  as close to the thyroid gland as possible.  Further retraction medially was carried out and the Bovie was used to separate the thyroid gland from the anterior tracheal wall.  Once Humbertos ligament was adequately , the thyroid isthmus was divided using a Harmonic scalpel.     Attention turned to the opposing gland which was taken down in a similar fashion. The strap muscles were elevated out laterally.  Once adequate elevation  of the strap muscles was achieved, we turned our attention to the superior pole.  There were 2 superior pole vessels emanating laterally and superiorly.  The harmonic scalpel was used to take down these vessels.  Blunt dissection then carried down inferiorly where the capsule was  from the thyroid parenchyma itself.  At this point, the thyroid gland was retracted medially and the capsule was further  from the surrounding tissue.  The carotid was identified in the lateral neck.  A few spreads were made within the Perkins and the recurrent laryngeal nerve was identified near the cricothyroid joint.  This was dissected both superiorly and inferiorly, and protected.  The thyroid was then  from the recurrent laryngeal nerve using a Perkins dissector.  The Prass probe was used to confirm the integrity of the recurrent laryngeal nerve.  Dissection continued down inferiorly where the inferior pole was  as close to the thyroid gland as possible.  Further retraction medially was carried out and the Bovie was used to separate the thyroid gland from the anterior tracheal wall.  Once Humbertos ligament was adequately , the thyroid isthmus was divided using a Harmonic scalpel.  A 10 mm fully-fenestrated MELISSA drain was placed emanating from the lateral neck.  It was secured using a 2-0 silk suture.  The drain was placed in the thyroid bed.  The strap muscles were reapproximated in the midline with 3-0 Vicryl suture in simple running fashion.  The platysma was then reapproximated also with 3-0 Vicryl suture in simple interrupted fashion.  Deep dermal sutures were then placed and the skin was then reapproximated using a 4-0 monocryl in running subcuticular fashion.  All counts were correct at the end of the case.  The patient tolerated the procedure well and there were no complications.

## 2022-05-05 NOTE — HISTORICAL [facility]
This is a historical note converted from [facility]hav[facility]. Formatting and pictures may have been removed.  Please reference Madhavi for original formatting and attached multimedia. Admission Date:  6/29/18  ?   Discharge Date:  6/30/18  ?   Attending Physician:  Edward Garcia  ?   Residents:  Radha Farah  ?  Final Diagnosis:  Graves Disease  Thyroid nodule  ?  Procedures:  Total Thyroidectomy  ?  Hospital Course: 42yF s/p total thyroidectomy for Graves?disease and multiple thyroid nodules. POD1 tolerating diet, ambulating without issue. Vitals stable. Neck incision cdi. No hematoma or seroma. Voice strong. Started on Synthroid. Methimazole discontinued. Calcium levels were normal.?PTH was 37. patient did note numbness?in her hands and + chvosteks sign so was sent home on calcium taper. Instructed to return if numbness worsened.  ???  Discharge Medications: Please see discharge med reconciliation  ???  Discharge Instructions  Discharge Diet regular  Discharge Activity no heavy lifting  Condition on Discharge: stable  Dispo: Home

## 2022-05-05 NOTE — HISTORICAL [facility]
This is a historical note converted from [employer]. Formatting and pictures may have been removed.  Please reference [employer] for original formatting and attached multimedia. Chief Complaint  new referral graves dis, c/o sore throat  History of Present Illness  42yF with h/o Graves on methimazole. recently increased to 20mg. also with nodules followed by u/s. no previous fna. no compressive symptoms. no dysphonia. no fh thyroid cancer. no xrt to neck.  ?   6/29/18: Pt here for thyroidectomy. No changes to health.  ?   Review of Systems  General: denies fatigue, unintentional weight loss, fever or chills  Eyes: denies vision changeEars: denies change in hearing, otorrhea, otalgia, tinnitus, or vertigo  Nose: denies rhinorrhea, nasal congestion, sneezing  Mouth: denies sore throat  Neck: denies new neck mass or lymphadenopathy  Respiratory: denies cough, dyspnea  Cardiovascular: denies chest pain  GI: denies dysphagia, or nausea  Skin: denies rash or changing skin lesion  Endocrine: denies heat or cold intolerance  Neurologic: denies headache, syncope, motor or sensory deficit  Psych: denies anxiety or depression  Physical Exam  ???Vitals & Measurements  ??T:?36.7? ?C (Oral)? HR:?72(Peripheral)? RR:?18? BP:?136/86?  ??HT:?162?cm? HT:?162?cm? WT:?85?kg? WT:?85?kg? BMI:?32.39?  Eye_EOMI, PERRLA  Ear Right TM normal, Left TM normal, normal pinnas and EACs  Nose - Inferior turbinates normal  Oral cavity - Tongue normal,Oropharynx - no lesions noted  Neck- no lymphadenopathy, no thyromegaly.  Respiratory- no increased work of breathing  Integumentary- no rashes, no skin lesions  Neurologic- CN II-XII intact  ?   ???  The nasal vault was decongested with the use of topical decongestants,  ?   The patient underwent insertion of a flexible endoscope into the nasal vault, bilaterally. The endoscope was passed along the floor of the nasal vault and then also passed along the upper portion of the nasal vault first on the left side  and then on the right side.?Exam revealed?inferior turbinates, middle turbinates and superior turbinates bilaterally which were normal. The nasal septum is in the midline and the orifices for the maxillary sinuses bilaterally were clear. The patient tolerated the insertion of the scope into the nasopharyx, oropharynx and above the larynx. Findings include:  The larynx was visualized and noted to have normal vocal cord mobility. The arytenoid mucosa was normal. The epiglottis was normal. The scope was withdrawn without any complications. The patient tolerated the procedure well without any complications.  Assessment/Plan  ?  Graves  T4 1.91, on methimazole, recently increased to 20mg  ?  Nodules  increase in size of nodules and with microcalcifications, no previous FNA  ?  OR today for?total thyroidectomy  Lugols  No changes to above H&P Problem List/Past Medical History  ??Ongoing  ??Graves disease  ??Hypertension  ??Hypothyroidism  ??Obesity  ?Historical  ??No qualifying data  Procedure/Surgical History  Repair Left Hand Subcutaneous Tissue and Fascia, Open Approach (08/26/2017), Simple repair of superficial wounds of scalp, neck, axillae, external genitalia, trunk and/or extremities (including hands and feet); 2.5 cm or less (08/26/2017), Dilation and curettage.  Medications  ??amLODIPine 10 mg oral tablet, 10 mg= 1 tab(s), Oral, Daily, 6 refills  ??AMOXICILLIN CAP 500MG, 500 mg= 1 cap(s), Oral, TID,? ?Not Taking, Completed Rx  ??FEMYNOR TAB 0.25-35, 1 tab(s), Oral, Daily,? ?Not taking  ??LISINOP/HCTZ TAB 10-12.5, 1 tab(s), Oral, Daily,? ?Not taking  ??METHIMAZOLE TAB 10MG, 10 mg= 1 tab(s), Oral, TID  ??METHIMAZOLE TAB 5MG, 5 mg= 1 tab(s), Oral, q8hr,? ?Not taking  ??METHYLPRED TAB 4MG,? ?Not Taking, Completed Rx  ??PREDNISONE TAB 20MG, 20 mg= 1 tab(s), Oral, Daily,? ?Not Taking, Completed Rx  ??PROPRANOLOL TAB 10MG, 10 mg= 1 tab(s), Oral, BID,? ?Still taking, not as prescribed: PRN  ??TOBRA/DEXAME KI 0.3-0.1%,?  ?Not Taking, Completed Rx  ??TOPIRAMATE TAB 25MG,? ?Not taking  Allergies  iodine?(Swelling)  sulfa drugs?(Swelling)  Social History  ??Alcohol  ???Never, 05/17/2018  ???Never, 09/10/2016  ???Never, 09/10/2016  ??Employment/School  ???Employed, Highest education level: High school., 03/27/2018  ??Exercise  ???Self assessment: Good condition., 04/19/2018  ??Home/Environment  ???Lives with Children. Living situation: Home/Independent. Home equipment: BP machine. Alcohol abuse in household: No. Substance abuse in household: No. Smoker in household: No. Feels unsafe at home: No. Safe place to go: Yes. Family/Friends available for support: Yes., 03/27/2018  ??Nutrition/Health  ???Type of diet: low sodium. Regular, 04/19/2018  ??Sexual  ???Sexually active: No., 05/17/2018  ??Substance Abuse  ???Never, 09/10/2016  ??Tobacco  ???Never smoker Use:., 05/17/2018  ???Never smoker, 09/10/2016  Family History  ??Diabetes mellitus type 2: Mother and Father.  ??Hypertension.: Mother.  ??Primary malignant neoplasm of prostate: Father.  Immunizations  ?Vaccine ?Date ?Status   ?tetanus/diphtheria/pertussis, acel(Tdap) 08/26/2017 Given      ?  ?

## 2022-09-13 ENCOUNTER — HOSPITAL ENCOUNTER (EMERGENCY)
Facility: HOSPITAL | Age: 47
Discharge: HOME | End: 2022-09-13
Attending: STUDENT IN AN ORGANIZED HEALTH CARE EDUCATION/TRAINING PROGRAM
Payer: MEDICAID

## 2022-09-13 VITALS
WEIGHT: 205 LBS | HEART RATE: 67 BPM | OXYGEN SATURATION: 100 % | TEMPERATURE: 98 F | SYSTOLIC BLOOD PRESSURE: 150 MMHG | BODY MASS INDEX: 32.95 KG/M2 | HEIGHT: 66 IN | DIASTOLIC BLOOD PRESSURE: 101 MMHG | RESPIRATION RATE: 16 BRPM

## 2022-09-13 DIAGNOSIS — S61.412A LACERATION OF LEFT HAND, FOREIGN BODY PRESENCE UNSPECIFIED, INITIAL ENCOUNTER: Primary | ICD-10-CM

## 2022-09-13 PROCEDURE — 63600175 PHARM REV CODE 636 W HCPCS: Performed by: STUDENT IN AN ORGANIZED HEALTH CARE EDUCATION/TRAINING PROGRAM

## 2022-09-13 PROCEDURE — 99284 EMERGENCY DEPT VISIT MOD MDM: CPT | Mod: 25

## 2022-09-13 PROCEDURE — 12001 RPR S/N/AX/GEN/TRNK 2.5CM/<: CPT

## 2022-09-13 PROCEDURE — 90715 TDAP VACCINE 7 YRS/> IM: CPT | Performed by: STUDENT IN AN ORGANIZED HEALTH CARE EDUCATION/TRAINING PROGRAM

## 2022-09-13 PROCEDURE — 90471 IMMUNIZATION ADMIN: CPT | Performed by: STUDENT IN AN ORGANIZED HEALTH CARE EDUCATION/TRAINING PROGRAM

## 2022-09-13 PROCEDURE — 25000003 PHARM REV CODE 250: Performed by: STUDENT IN AN ORGANIZED HEALTH CARE EDUCATION/TRAINING PROGRAM

## 2022-09-13 RX ORDER — AMLODIPINE BESYLATE 10 MG/1
10 TABLET ORAL DAILY
COMMUNITY
Start: 2022-08-25 | End: 2022-09-26 | Stop reason: SDUPTHER

## 2022-09-13 RX ORDER — CEPHALEXIN 500 MG/1
500 CAPSULE ORAL 4 TIMES DAILY
Qty: 20 CAPSULE | Refills: 0 | Status: SHIPPED | OUTPATIENT
Start: 2022-09-13 | End: 2022-09-18

## 2022-09-13 RX ORDER — TRANEXAMIC ACID 650 MG/1
650 TABLET ORAL 3 TIMES DAILY
COMMUNITY
Start: 2022-04-22 | End: 2022-10-17

## 2022-09-13 RX ORDER — PROPRANOLOL HYDROCHLORIDE 10 MG/1
10 TABLET ORAL 2 TIMES DAILY
COMMUNITY
Start: 2022-08-25 | End: 2022-09-26 | Stop reason: SDUPTHER

## 2022-09-13 RX ORDER — CITALOPRAM 20 MG/1
20 TABLET, FILM COATED ORAL DAILY
COMMUNITY
Start: 2022-08-09 | End: 2022-10-17

## 2022-09-13 RX ORDER — LEVOTHYROXINE SODIUM 125 UG/1
125 TABLET ORAL DAILY
COMMUNITY
Start: 2022-08-16 | End: 2022-09-26 | Stop reason: SDUPTHER

## 2022-09-13 RX ORDER — LIDOCAINE HYDROCHLORIDE AND EPINEPHRINE 10; 10 MG/ML; UG/ML
1 INJECTION, SOLUTION INFILTRATION; PERINEURAL ONCE
Status: COMPLETED | OUTPATIENT
Start: 2022-09-13 | End: 2022-09-13

## 2022-09-13 RX ADMIN — LIDOCAINE HYDROCHLORIDE,EPINEPHRINE BITARTRATE 1 ML: 10; .01 INJECTION, SOLUTION INFILTRATION; PERINEURAL at 08:09

## 2022-09-13 RX ADMIN — TETANUS TOXOID, REDUCED DIPHTHERIA TOXOID AND ACELLULAR PERTUSSIS VACCINE, ADSORBED 0.5 ML: 5; 2.5; 8; 8; 2.5 SUSPENSION INTRAMUSCULAR at 08:09

## 2022-09-14 NOTE — ED PROVIDER NOTES
"Encounter Date: 9/13/2022       History     Chief Complaint   Patient presents with    Laceration     Left thumb laceration. 10am today, pt was slicing food and knife slipped and cut thumb. Bleeding off and on since then so pt came here to have it checked. Not currently bleeding. Left hand cap refill < 2 sec and radial pulse +2.     Patient is 47-year-old  female with a past medical history of hypertension who presented to the ER today due to laceration right thumb.  Patient states she has sustained while cutting something in the kitchen.  Patient unsure when last tetanus shot was.  Patient states the laceration occurred earlier today.  Patient denies any pain.  Patient states he continues to ooze blood and she thinks she needs "stitches. "Patient denies any fever, chills, cough, congestion, chest pain, shortness of breath, abdominal pain.    Review of patient's allergies indicates:   Allergen Reactions    Iodine Swelling    Sulfamethoxazole-trimethoprim Swelling     Past Medical History:   Diagnosis Date    Hypertension     Thyroid disease      Past Surgical History:   Procedure Laterality Date    DILATION AND CURETTAGE OF UTERUS N/A     THYROIDECTOMY Bilateral      History reviewed. No pertinent family history.  Social History     Tobacco Use    Smoking status: Never    Smokeless tobacco: Never   Substance Use Topics    Alcohol use: Never    Drug use: Never     Review of Systems   Constitutional:  Negative for chills, fatigue and fever.   HENT:  Negative for congestion, sore throat and trouble swallowing.    Eyes:  Negative for pain and visual disturbance.   Respiratory:  Negative for cough, shortness of breath and wheezing.    Cardiovascular:  Negative for chest pain and palpitations.   Gastrointestinal:  Negative for abdominal pain, blood in stool, constipation, diarrhea, nausea and vomiting.   Genitourinary:  Negative for dysuria and hematuria.   Musculoskeletal:  Negative for back pain and " myalgias.   Skin:  Positive for wound. Negative for rash.   Neurological:  Negative for seizures, syncope and headaches.   Psychiatric/Behavioral:  Negative for confusion. The patient is not nervous/anxious.      Physical Exam     Initial Vitals [09/13/22 1930]   BP Pulse Resp Temp SpO2   (!) 147/102 79 16 98.4 °F (36.9 °C) 100 %      MAP       --         Physical Exam    Nursing note and vitals reviewed.  Constitutional: She appears well-developed and well-nourished. She is not diaphoretic. No distress.   HENT:   Head: Normocephalic.   Right Ear: External ear normal.   Left Ear: External ear normal.   Nose: Nose normal.   Eyes: Conjunctivae and EOM are normal. Right eye exhibits no discharge. Left eye exhibits no discharge. No scleral icterus.   Neck:   Normal range of motion.  Cardiovascular:  Normal rate, regular rhythm, normal heart sounds and intact distal pulses.     Exam reveals no gallop and no friction rub.       No murmur heard.  Pulmonary/Chest: Breath sounds normal. No stridor. No respiratory distress. She has no wheezes. She has no rhonchi. She has no rales.   Musculoskeletal:         General: No tenderness or edema. Normal range of motion.      Cervical back: Normal range of motion.     Neurological: She is alert. No cranial nerve deficit.   Skin: Skin is warm. No rash noted. No erythema.   There is a 1.2 cm linear laceration relatively superficial over the dorsum of the proximal 1st metacarpal.  Clean wound no contamination.   Psychiatric: She has a normal mood and affect. Her behavior is normal.       ED Course   Lac Repair    Date/Time: 9/13/2022 9:14 PM  Performed by: Hugo De Oliveira MD  Authorized by: Hugo De Oliveira MD     Consent:     Consent obtained:  Verbal    Consent given by:  Patient    Risks, benefits, and alternatives were discussed: yes      Risks discussed:  Infection, need for additional repair, nerve damage, poor wound healing, poor cosmetic result, pain, retained foreign body, tendon  damage and vascular damage    Alternatives discussed:  No treatment  Universal protocol:     Procedure explained and questions answered to patient or proxy's satisfaction: yes      Relevant documents present and verified: yes      Site/side marked: yes      Immediately prior to procedure, a time out was called: yes      Patient identity confirmed:  Verbally with patient, arm band and hospital-assigned identification number  Anesthesia:     Anesthesia method:  Local infiltration    Local anesthetic:  Lidocaine 1% WITH epi  Laceration details:     Location:  Hand    Hand location:  L hand, dorsum    Length (cm):  1.2    Depth (mm):  2  Pre-procedure details:     Preparation:  Patient was prepped and draped in usual sterile fashion  Exploration:     Limited defect created (wound extended): no      Hemostasis achieved with:  Direct pressure    Imaging outcome: foreign body not noted      Wound exploration: wound explored through full range of motion and entire depth of wound visualized      Contaminated: no    Treatment:     Area cleansed with:  Povidone-iodine    Amount of cleaning:  Standard    Debridement:  None    Undermining:  None    Scar revision: no    Skin repair:     Repair method:  Sutures    Suture size:  4-0    Suture material:  Prolene    Suture technique:  Simple interrupted    Number of sutures:  2  Approximation:     Approximation:  Close  Repair type:     Repair type:  Simple  Post-procedure details:     Dressing:  Non-adherent dressing    Procedure completion:  Tolerated  Labs Reviewed - No data to display       Imaging Results    None          Medications   LIDOcaine-EPINEPHrine 1%-1:100,000 injection 1 mL (has no administration in time range)   Tdap (BOOSTRIX) vaccine injection 0.5 mL (0.5 mLs Intramuscular Given 9/13/22 2040)     Medical Decision Making:   Initial Assessment:   Overall well-appearing 47-year-old female  Differential Diagnosis:   Laceration  ED Management:  Vital signs stable  patient is afebrile  Boostrix given   Simple laceration  Offer patient Dermabond, Steri-Strips, laceration with suture repair  Patient shows suture repair  Sutures will need to remain in place for 10 days   Keep area clean dry for the next 2 days   Keflex and pharmacy   Return precautions discussed and follow-up PCP is recommended                        Clinical Impression:   Final diagnoses:  [R00.693Y] Laceration of left hand, foreign body presence unspecified, initial encounter (Primary)      ED Disposition Condition    Discharge Stable          ED Prescriptions       Medication Sig Dispense Start Date End Date Auth. Provider    cephALEXin (KEFLEX) 500 MG capsule Take 1 capsule (500 mg total) by mouth 4 (four) times daily. for 5 days 20 capsule 9/13/2022 9/18/2022 Hugo De Oliveira MD          Follow-up Information       Follow up With Specialties Details Why Contact Info    [facility] - Emergency Dept Emergency Medicine In 10 days If symptoms worsen, For suture removal [address] [facility] [address]  [phone]             Hugo De Oliveira MD  09/13/22 4702

## 2022-09-26 ENCOUNTER — OFFICE VISIT (OUTPATIENT)
Dept: FAMILY MEDICINE | Facility: CLINIC | Age: 47
End: 2022-09-26
Payer: MEDICAID

## 2022-09-26 VITALS
DIASTOLIC BLOOD PRESSURE: 87 MMHG | HEIGHT: 66 IN | BODY MASS INDEX: 36.03 KG/M2 | RESPIRATION RATE: 18 BRPM | WEIGHT: 224.19 LBS | HEART RATE: 60 BPM | TEMPERATURE: 98 F | SYSTOLIC BLOOD PRESSURE: 145 MMHG

## 2022-09-26 DIAGNOSIS — D64.9 ANEMIA, UNSPECIFIED TYPE: ICD-10-CM

## 2022-09-26 DIAGNOSIS — E03.9 HYPOTHYROIDISM, UNSPECIFIED TYPE: Primary | ICD-10-CM

## 2022-09-26 DIAGNOSIS — Z79.890 HORMONE REPLACEMENT THERAPY (HRT): ICD-10-CM

## 2022-09-26 DIAGNOSIS — S61.019A LACERATION OF THUMB WITHOUT FOREIGN BODY WITHOUT DAMAGE TO NAIL, UNSPECIFIED LATERALITY, INITIAL ENCOUNTER: ICD-10-CM

## 2022-09-26 PROCEDURE — 3079F DIAST BP 80-89 MM HG: CPT | Mod: CPTII,,, | Performed by: FAMILY MEDICINE

## 2022-09-26 PROCEDURE — 3008F PR BODY MASS INDEX (BMI) DOCUMENTED: ICD-10-PCS | Mod: CPTII,,, | Performed by: FAMILY MEDICINE

## 2022-09-26 PROCEDURE — 99214 OFFICE O/P EST MOD 30 MIN: CPT | Mod: PBBFAC | Performed by: FAMILY MEDICINE

## 2022-09-26 PROCEDURE — 3077F PR MOST RECENT SYSTOLIC BLOOD PRESSURE >= 140 MM HG: ICD-10-PCS | Mod: CPTII,,, | Performed by: FAMILY MEDICINE

## 2022-09-26 PROCEDURE — 3077F SYST BP >= 140 MM HG: CPT | Mod: CPTII,,, | Performed by: FAMILY MEDICINE

## 2022-09-26 PROCEDURE — 3079F PR MOST RECENT DIASTOLIC BLOOD PRESSURE 80-89 MM HG: ICD-10-PCS | Mod: CPTII,,, | Performed by: FAMILY MEDICINE

## 2022-09-26 PROCEDURE — 99214 OFFICE O/P EST MOD 30 MIN: CPT | Mod: S$PBB,,, | Performed by: FAMILY MEDICINE

## 2022-09-26 PROCEDURE — 3008F BODY MASS INDEX DOCD: CPT | Mod: CPTII,,, | Performed by: FAMILY MEDICINE

## 2022-09-26 PROCEDURE — 1159F PR MEDICATION LIST DOCUMENTED IN MEDICAL RECORD: ICD-10-PCS | Mod: CPTII,,, | Performed by: FAMILY MEDICINE

## 2022-09-26 PROCEDURE — 1159F MED LIST DOCD IN RCRD: CPT | Mod: CPTII,,, | Performed by: FAMILY MEDICINE

## 2022-09-26 PROCEDURE — 99214 PR OFFICE/OUTPT VISIT, EST, LEVL IV, 30-39 MIN: ICD-10-PCS | Mod: S$PBB,,, | Performed by: FAMILY MEDICINE

## 2022-09-26 RX ORDER — AMLODIPINE BESYLATE 10 MG/1
10 TABLET ORAL DAILY
Qty: 90 TABLET | Refills: 3 | Status: SHIPPED | OUTPATIENT
Start: 2022-09-26 | End: 2023-08-10 | Stop reason: SDUPTHER

## 2022-09-26 RX ORDER — PROPRANOLOL HYDROCHLORIDE 10 MG/1
10 TABLET ORAL 2 TIMES DAILY
Qty: 180 TABLET | Refills: 3 | Status: SHIPPED | OUTPATIENT
Start: 2022-09-26 | End: 2023-08-10 | Stop reason: SDUPTHER

## 2022-09-26 RX ORDER — LEVOTHYROXINE SODIUM 125 UG/1
125 TABLET ORAL DAILY
Qty: 90 TABLET | Refills: 1 | Status: SHIPPED | OUTPATIENT
Start: 2022-09-26 | End: 2023-08-10 | Stop reason: SDUPTHER

## 2022-09-26 NOTE — PROGRESS NOTES
Graham Frederick  09/26/2022  [id number]                Chief Complaint:  Chief Complaint   Patient presents with    Follow-up       History of Present Illness:    45 yo F PMH past medical history of hypertension, hypothyroidism status post thyroidectomy, anemia that presents for follow up. Requesting GYN for HRT. Went to  an outside clinic and had a pellet hormone replaced.   Clinic was cash pay. Was also told her testerone and cholesterol was off. Would like to be further evaluated.  Bleeding is somewhat improved. Follows with Hematology  Would like to do labs in [place]  Needs sutures removed. Sustained a thumb laceration at work 10 days ago. Was seen in ED 2 sutures placed.       History:  Past Medical History:   Diagnosis Date    Hypertension     Thyroid disease      Past Surgical History:   Procedure Laterality Date    DILATION AND CURETTAGE OF UTERUS N/A     THYROIDECTOMY Bilateral      Family History   Problem Relation Age of Onset    Hypertension Mother     Cancer Father     Depression Father     Diabetes Father      Social History     Socioeconomic History    Marital status:    Tobacco Use    Smoking status: Never    Smokeless tobacco: Never   Substance and Sexual Activity    Alcohol use: Never    Drug use: Never    Sexual activity: Not Currently     Partners: Female     There is no problem list on file for this patient.    Review of patient's allergies indicates:   Allergen Reactions    Iodine Swelling    Sulfamethoxazole-trimethoprim Swelling         Medications:  Current Outpatient Medications on File Prior to Visit   Medication Sig Dispense Refill    citalopram (CELEXA) 20 MG tablet Take 20 mg by mouth once daily.      tranexamic acid (LYSTEDA) 650 mg tablet Take 650 mg by mouth 3 (three) times daily.      [DISCONTINUED] amLODIPine (NORVASC) 10 MG tablet Take 10 mg by mouth once daily.      [DISCONTINUED] levothyroxine (SYNTHROID) 125 MCG tablet Take 125 mcg by mouth once daily.      [DISCONTINUED]  propranoloL (INDERAL) 10 MG tablet Take 10 mg by mouth 2 (two) times daily.       No current facility-administered medications on file prior to visit.       Medications have been reviewed and reconciled with patient at this visit.    Adverse reactions to current medications reviewed with patient..      Exam:  Wt Readings from Last 3 Encounters:   09/26/22 101.7 kg (224 lb 3.3 oz)   09/13/22 93 kg (205 lb)   12/10/21 100.4 kg (221 lb 5.5 oz)     Temp Readings from Last 3 Encounters:   09/26/22 97.7 °F (36.5 °C)   09/13/22 98.4 °F (36.9 °C) (Oral)     BP Readings from Last 3 Encounters:   09/26/22 (!) 145/87   09/13/22 (!) 150/101   12/10/21 (!) 139/101     Pulse Readings from Last 3 Encounters:   09/26/22 60   09/13/22 67     Body mass index is 36.19 kg/m².      ROS:  See HPI for details    Constitutional: Denies Change in appetite. Denies Chills. Denies Fever. Denies Night sweats.  Eye: Denies Blurred vision. Denies Discharge. Denies Eye pain.  ENT: Denies Decreased hearing. Denies Sore throat. Denies Swollen glands.  Respiratory: Denies Cough. Denies Shortness of breath. Denies Shortness of breath with exertion. Denies Wheezing.  Cardiovascular: Denies Chest pain at rest. Denies Chest pain with exertion. Denies Irregular heartbeat. Denies Palpitations.  Gastrointestinal: Denies Abdominal pain. Denies Diarrhea. Denies Nausea. Denies Vomiting. Denies Hematemesis or Hematochezia.  Genitourinary: Denies Dysuria. Denies Urinary frequency. Denies Urinary urgency. Denies Blood in urine.  Endocrine: Denies Cold intolerance. Denies Excessive thirst. Denies Heat intolerance. Denies Weight loss. Denies Weight gain.  Musculoskeletal: Denies Painful joints. Denies Weakness.  Integumentary: Denies Rash. Denies Itching. Denies Dry skin.  Neurologic: Denies Dizziness. Denies Fainting. Denies Headache.  Psychiatric: Denies Depression. Denies Anxiety. Denies Suicidal/Homicidal ideations.    All Other ROS: Negative except as stated  in HPI.    PE:  General: Alert and oriented, No acute distress.  Head: Normocephalic, Atraumatic.  Eye: Pupils are equal, round and reactive to light, Extraocular movements are intact, Sclera non-icteric.  Ears/Nose/Throat: Normal, Mucosa moist,Clear.  Neck/Thyroid: Supple, Non-tender, No carotid bruit, No palpable thyromegaly or thyroid nodule, No lymphadenopathy, No JVD, Full range of motion.  Respiratory: Clear to auscultation bilaterally; No wheezes, rales or rhonchi,Non-labored respirations, Symmetrical chest wall expansion.  Cardiovascular: Regular rate and rhythm, S1/S2 normal, No murmurs, rubs or gallops.  Gastrointestinal: Soft, Non-tender, Non-distended, Normal bowel sounds, No palpable organomegaly.  Musculoskeletal: Normal range of motion.  Integumentary: Warm, Dry, Intact, No suspicious lesions or rashes.  Extremities: No clubbing, cyanosis or edema  Neurologic: No focal deficits, Cranial Nerves II-XII are grossly intact, Motor strength normal upper and lower extremities, Sensory exam intact.  Psychiatric: Normal interaction, Coherent speech, Euthymic mood, Appropriate affect    Laboratory Reviewed ({Yes)  Lab Results   Component Value Date    WBC 3.20 04/08/2022    HGB 13.5 04/08/2022    HCT 39.8 04/08/2022     04/08/2022    CHOL 201 02/10/2022    TRIG 64 02/10/2022    HDL 53 02/10/2022    ALT 13 04/08/2022    AST 15 04/08/2022     04/08/2022    K 3.7 04/08/2022    CREATININE 0.73 04/08/2022    BUN 10.0 04/08/2022    CO2 23 04/08/2022    TSH 1.3446 02/10/2022    INR 1.06 06/27/2018    HGBA1C 5.1 07/27/2021       Date:  9/26/2022.     Confirmed: patient, procedure.     Performed by: attending physician.     Preparation and technique: wound cleaning.     Wound assessment:: the fingers/thumb, blue in color, characteristics clean, no inflammatory changes (no redness, no swelling, no warmth), course improving.     Insertion/ Removal: Number of sutures removed  2.     Dressing applied: adhesive  strip.     Procedure tolerated: well.     Complications: none.     Care instructions given    Graham was seen today for follow-up.    Diagnoses and all orders for this visit:    Hypothyroidism, unspecified type  -     Lipid Panel; Future  -     TSH; Future  -     T4, Free; Future  -     CBC Auto Differential; Future  -     Comprehensive Metabolic Panel; Future    Anemia, unspecified type  -     Lipid Panel; Future  -     TSH; Future  -     T4, Free; Future  -     CBC Auto Differential; Future  -     Comprehensive Metabolic Panel; Future    Laceration of thumb without foreign body without damage to nail, unspecified laterality, initial encounter    Hormone replacement therapy (HRT)  -     Ambulatory referral/consult to Gynecology    Other orders  -     amLODIPine (NORVASC) 10 MG tablet; Take 1 tablet (10 mg total) by mouth once daily.  -     levothyroxine (SYNTHROID) 125 MCG tablet; Take 1 tablet (125 mcg total) by mouth once daily.  -     propranoloL (INDERAL) 10 MG tablet; Take 1 tablet (10 mg total) by mouth 2 (two) times daily.        Suture removed  Labs ordered  Referred to GYN for HRT; last visit 12/2021 for AUB  DASH diet, lifestyle modifications        Care Plan/Goals: Reviewed    Goals    None         Follow up: Follow up in about 6 months (around 3/26/2023).

## 2022-10-05 DIAGNOSIS — D75.839 THROMBOCYTOSIS: Primary | ICD-10-CM

## 2022-10-06 ENCOUNTER — LAB VISIT (OUTPATIENT)
Dept: LAB | Facility: HOSPITAL | Age: 47
End: 2022-10-06
Attending: FAMILY MEDICINE
Payer: MEDICAID

## 2022-10-06 DIAGNOSIS — E03.9 HYPOTHYROIDISM, UNSPECIFIED TYPE: ICD-10-CM

## 2022-10-06 DIAGNOSIS — D75.839 THROMBOCYTOSIS: ICD-10-CM

## 2022-10-06 DIAGNOSIS — D64.9 ANEMIA, UNSPECIFIED TYPE: ICD-10-CM

## 2022-10-06 LAB
ALBUMIN SERPL-MCNC: 4 GM/DL (ref 3.5–5)
ALBUMIN/GLOB SERPL: 1.1 RATIO (ref 1.1–2)
ALP SERPL-CCNC: 58 UNIT/L (ref 40–150)
ALT SERPL-CCNC: 18 UNIT/L (ref 0–55)
AST SERPL-CCNC: 15 UNIT/L (ref 5–34)
BASOPHILS # BLD AUTO: 0.05 X10(3)/MCL (ref 0–0.2)
BASOPHILS NFR BLD AUTO: 1.1 %
BILIRUBIN DIRECT+TOT PNL SERPL-MCNC: 0.4 MG/DL
BUN SERPL-MCNC: 12 MG/DL (ref 7–18.7)
CALCIUM SERPL-MCNC: 9.9 MG/DL (ref 8.4–10.2)
CHLORIDE SERPL-SCNC: 107 MMOL/L (ref 98–107)
CHOLEST SERPL-MCNC: 210 MG/DL
CHOLEST/HDLC SERPL: 4 {RATIO} (ref 0–5)
CO2 SERPL-SCNC: 24 MMOL/L (ref 22–29)
CREAT SERPL-MCNC: 0.81 MG/DL (ref 0.55–1.02)
EOSINOPHIL # BLD AUTO: 0.09 X10(3)/MCL (ref 0–0.9)
EOSINOPHIL NFR BLD AUTO: 1.9 %
ERYTHROCYTE [DISTWIDTH] IN BLOOD BY AUTOMATED COUNT: 13.1 % (ref 11.5–17)
FERRITIN SERPL-MCNC: 15.75 NG/ML (ref 4.63–204)
GFR SERPLBLD CREATININE-BSD FMLA CKD-EPI: >60 MLS/MIN/1.73/M2
GLOBULIN SER-MCNC: 3.6 GM/DL (ref 2.4–3.5)
GLUCOSE SERPL-MCNC: 95 MG/DL (ref 74–100)
HCT VFR BLD AUTO: 38 % (ref 37–47)
HDLC SERPL-MCNC: 56 MG/DL (ref 35–60)
HGB BLD-MCNC: 12.9 GM/DL (ref 12–16)
IMM GRANULOCYTES # BLD AUTO: 0.02 X10(3)/MCL (ref 0–0.04)
IMM GRANULOCYTES NFR BLD AUTO: 0.4 %
IRON SATN MFR SERPL: 24 % (ref 20–50)
IRON SERPL-MCNC: 80 UG/DL (ref 50–170)
LDLC SERPL CALC-MCNC: 143 MG/DL (ref 50–140)
LYMPHOCYTES # BLD AUTO: 1.65 X10(3)/MCL (ref 0.6–4.6)
LYMPHOCYTES NFR BLD AUTO: 34.7 %
MCH RBC QN AUTO: 29.5 PG (ref 27–31)
MCHC RBC AUTO-ENTMCNC: 33.9 MG/DL (ref 33–36)
MCV RBC AUTO: 87 FL (ref 80–94)
MONOCYTES # BLD AUTO: 0.51 X10(3)/MCL (ref 0.1–1.3)
MONOCYTES NFR BLD AUTO: 10.7 %
NEUTROPHILS # BLD AUTO: 2.4 X10(3)/MCL (ref 2.1–9.2)
NEUTROPHILS NFR BLD AUTO: 51.2 %
NRBC BLD AUTO-RTO: 0 %
PLATELET # BLD AUTO: 411 X10(3)/MCL (ref 130–400)
PMV BLD AUTO: 9.9 FL (ref 7.4–10.4)
POTASSIUM SERPL-SCNC: 3.6 MMOL/L (ref 3.5–5.1)
PROT SERPL-MCNC: 7.6 GM/DL (ref 6.4–8.3)
RBC # BLD AUTO: 4.37 X10(6)/MCL (ref 4.2–5.4)
SODIUM SERPL-SCNC: 138 MMOL/L (ref 136–145)
T4 FREE SERPL-MCNC: 1.09 NG/DL (ref 0.7–1.48)
TIBC SERPL-MCNC: 256 UG/DL (ref 70–310)
TIBC SERPL-MCNC: 336 UG/DL (ref 250–450)
TRANSFERRIN SERPL-MCNC: 299 MG/DL (ref 180–382)
TRIGL SERPL-MCNC: 55 MG/DL (ref 37–140)
TSH SERPL-ACNC: 0.77 UIU/ML (ref 0.35–4.94)
VLDLC SERPL CALC-MCNC: 11 MG/DL
WBC # SPEC AUTO: 4.8 X10(3)/MCL (ref 4.5–11.5)

## 2022-10-06 PROCEDURE — 85025 COMPLETE CBC W/AUTO DIFF WBC: CPT

## 2022-10-06 PROCEDURE — 80061 LIPID PANEL: CPT

## 2022-10-06 PROCEDURE — 84443 ASSAY THYROID STIM HORMONE: CPT

## 2022-10-06 PROCEDURE — 36415 COLL VENOUS BLD VENIPUNCTURE: CPT

## 2022-10-06 PROCEDURE — 84439 ASSAY OF FREE THYROXINE: CPT

## 2022-10-06 PROCEDURE — 82728 ASSAY OF FERRITIN: CPT

## 2022-10-06 PROCEDURE — 83540 ASSAY OF IRON: CPT

## 2022-10-06 PROCEDURE — 80053 COMPREHEN METABOLIC PANEL: CPT

## 2022-10-10 ENCOUNTER — TELEPHONE (OUTPATIENT)
Dept: FAMILY MEDICINE | Facility: CLINIC | Age: 47
End: 2022-10-10
Payer: MEDICAID

## 2022-10-10 NOTE — TELEPHONE ENCOUNTER
Please let patient know:    Cholesterol is elevated. Recommend low fat diet. Recommend high fiber diet, fresh fruits and vegetables. Recommend fish, like salmon and tuna and low sodium.

## 2022-10-11 NOTE — TELEPHONE ENCOUNTER
Called patient to give results. Patient verbalized understanding. No additional questions at this time.    oral

## 2022-10-17 ENCOUNTER — OFFICE VISIT (OUTPATIENT)
Dept: HEMATOLOGY/ONCOLOGY | Facility: CLINIC | Age: 47
End: 2022-10-17
Payer: MEDICAID

## 2022-10-17 DIAGNOSIS — E61.1 IRON DEFICIENCY: ICD-10-CM

## 2022-10-17 DIAGNOSIS — D75.839 THROMBOCYTOSIS: Primary | ICD-10-CM

## 2022-10-17 PROCEDURE — 99212 OFFICE O/P EST SF 10 MIN: CPT | Mod: PBBFAC | Performed by: NURSE PRACTITIONER

## 2022-10-17 PROCEDURE — 1160F RVW MEDS BY RX/DR IN RCRD: CPT | Mod: CPTII,95,, | Performed by: NURSE PRACTITIONER

## 2022-10-17 PROCEDURE — 1159F MED LIST DOCD IN RCRD: CPT | Mod: CPTII,95,, | Performed by: NURSE PRACTITIONER

## 2022-10-17 PROCEDURE — 1159F PR MEDICATION LIST DOCUMENTED IN MEDICAL RECORD: ICD-10-PCS | Mod: CPTII,95,, | Performed by: NURSE PRACTITIONER

## 2022-10-17 PROCEDURE — 99999 PR PBB SHADOW E&M-EST. PATIENT-LVL II: ICD-10-PCS | Mod: PBBFAC,,, | Performed by: NURSE PRACTITIONER

## 2022-10-17 PROCEDURE — 1160F PR REVIEW ALL MEDS BY PRESCRIBER/CLIN PHARMACIST DOCUMENTED: ICD-10-PCS | Mod: CPTII,95,, | Performed by: NURSE PRACTITIONER

## 2022-10-17 PROCEDURE — 99213 OFFICE O/P EST LOW 20 MIN: CPT | Mod: 95,,, | Performed by: NURSE PRACTITIONER

## 2022-10-17 PROCEDURE — 99999 PR PBB SHADOW E&M-EST. PATIENT-LVL II: CPT | Mod: PBBFAC,,, | Performed by: NURSE PRACTITIONER

## 2022-10-17 PROCEDURE — 99213 PR OFFICE/OUTPT VISIT, EST, LEVL III, 20-29 MIN: ICD-10-PCS | Mod: 95,,, | Performed by: NURSE PRACTITIONER

## 2022-10-17 NOTE — PROGRESS NOTES
[facility] Hematology/Oncology  PROGRESS NOTE      Subjective:         NAME: Graham Frederick : 1975     47 y.o. female   MRN: [id number]    Referring Doc: No ref. provider found    Established Patient - Audio Only Telehealth Visit     The patient location is: Home  Visit type: Virtual visit with audio only (telephone)  Total time spent with patient: 20 mins       The reason for the audio only service rather than synchronous audio and video virtual visit was related to technical difficulties or patient preference/necessity.     Each patient to whom I provide medical services by telemedicine is:  (1) informed of the relationship between the physician and patient and the respective role of any other health care provider with respect to management of the patient; and (2) notified that they may decline to receive medical services by telemedicine and may withdraw from such care at any time. Patient verbally consented to receive this service via voice-only telephone call.    This service was not originating from a related E/M service provided within the previous 7 days nor will  to an E/M service or procedure within the next 24 hours or my soonest available appointment.  Prevailing standard of care was able to be met in this audio-only visit.      Chief Complaint:    Chief Complaint   Patient presents with    IRON DEFICIENCY ANEMIA     Pt reports headaches and being lightheaded for the past 2 weeks.       Hematology History:    Ms. Frederick is a 48yo female referred by Dr. Carey Hightower for thrombocytosis. She reports she first saw this on her labs in the patient portal when she was s/p thyroidectomy 2019.     She was put on oral iron 2020. She stopped it due to constipation - she was taking about twice per day. She has a history of menorrhagia with cycles lasting 7 days. She changes pad/tampon about 4-5 times a day. She does have clots, about quarter sized. Upon referral to our service she was found with  iron deficiency and given Ferraheme 10/19/20 and 10/26/20.     Interval History:    Ms. Frederick presents today for telephone visit  for a history of iron deficiency. She reports feeling well, with good energy. She has been feeling light headed and experiencing headaches for which she will discuss with her PCP. She claims intolerance to oral iron r/t constipation. She is not taking Flinstones vitamin with iron as there is low availability in the stores per the patient.  She admits to ongoing heavy menstrual cycles and will call her GYN for a refill of her medicine which helped control the bleeding. (She is unsure of the name.)         ROS:   GEN: normal without any fever, night sweats or weight loss  HEENT: normal with no HA's, sore throat, stiff neck, changes in vision  CV: normal with no CP, SOB, PND, DOUGLAS or orthopnea  PULM: normal with no SOB, cough, hemoptysis, sputum or pleuritic pain  GI: normal with no abdominal pain, nausea, vomiting, constipation, diarrhea, melanotic stools, BRBPR, or hematemesis  : normal, no dysuria  BREAST: normal with no mass, discharge, pain  SKIN: normal with no rash, erythema, bruising, or swelling    Allergies:  Review of patient's allergies indicates:   Allergen Reactions    Iodine Swelling    Sulfamethoxazole-trimethoprim Swelling       Medications:    Current Outpatient Medications:     amLODIPine (NORVASC) 10 MG tablet, Take 1 tablet (10 mg total) by mouth once daily., Disp: 90 tablet, Rfl: 3    levothyroxine (SYNTHROID) 125 MCG tablet, Take 1 tablet (125 mcg total) by mouth once daily., Disp: 90 tablet, Rfl: 1    propranoloL (INDERAL) 10 MG tablet, Take 1 tablet (10 mg total) by mouth 2 (two) times daily., Disp: 180 tablet, Rfl: 3    PMHx/PSHx Updates:   Past Medical History:   Diagnosis Date    Hypertension     Thyroid disease  Constipation   Graves disease   Hypertension   Hypothyroidism   Obesity   Tachycardia         Past Surgical History:   Procedure Laterality Date     DILATION AND CURETTAGE OF UTERUS N/A     THYROIDECTOMY Bilateral        Family History:  Family History   Problem Relation Age of Onset    Hypertension Mother     Cancer Father     Depression Father     Diabetes Father        Social History:  Social History     Socioeconomic History    Marital status:    Tobacco Use    Smoking status: Never    Smokeless tobacco: Never   Substance and Sexual Activity    Alcohol use: Never    Drug use: Never    Sexual activity: Not Currently     Partners: Female       Physical Examination:   Limited due to the nature of the visit.     Imagin/21/20 MMG A routine screening mammogram in one year in the absence of significant clinical findings in the interval is recommended (2021).     Labs:   No visits with results within 1 Week(s) from this visit.   Latest known visit with results is:   Lab Visit on 10/06/2022   Component Date Value Ref Range Status    Cholesterol Total 10/06/2022 210 (H)  <=200 mg/dL Final    HDL Cholesterol 10/06/2022 56  35 - 60 mg/dL Final    Triglyceride 10/06/2022 55  37 - 140 mg/dL Final    Cholesterol/HDL Ratio 10/06/2022 4  0 - 5 Final    Very Low Density Lipoprotein 10/06/2022 11   Final    LDL Cholesterol 10/06/2022 143.00 (H)  50.00 - 140.00 mg/dL Final    Thyroid Stimulating Hormone 10/06/2022 0.7749  0.3500 - 4.9400 uIU/mL Final    Thyroxine Free 10/06/2022 1.09  0.70 - 1.48 ng/dL Final    Sodium Level 10/06/2022 138  136 - 145 mmol/L Final    Potassium Level 10/06/2022 3.6  3.5 - 5.1 mmol/L Final    Chloride 10/06/2022 107  98 - 107 mmol/L Final    Carbon Dioxide 10/06/2022 24  22 - 29 mmol/L Final    Glucose Level 10/06/2022 95  74 - 100 mg/dL Final    Blood Urea Nitrogen 10/06/2022 12.0  7.0 - 18.7 mg/dL Final    Creatinine 10/06/2022 0.81  0.55 - 1.02 mg/dL Final    Calcium Level Total 10/06/2022 9.9  8.4 - 10.2 mg/dL Final    Protein Total 10/06/2022 7.6  6.4 - 8.3 gm/dL Final    Albumin Level 10/06/2022 4.0  3.5 - 5.0 gm/dL  Final    Globulin 10/06/2022 3.6 (H)  2.4 - 3.5 gm/dL Final    Albumin/Globulin Ratio 10/06/2022 1.1  1.1 - 2.0 ratio Final    Bilirubin Total 10/06/2022 0.4  <=1.5 mg/dL Final    Alkaline Phosphatase 10/06/2022 58  40 - 150 unit/L Final    Alanine Aminotransferase 10/06/2022 18  0 - 55 unit/L Final    Aspartate Aminotransferase 10/06/2022 15  5 - 34 unit/L Final    eGFR 10/06/2022 >60  mls/min/1.73/m2 Final    Iron Binding Capacity Unsaturated 10/06/2022 256  70 - 310 ug/dL Final    Iron Level 10/06/2022 80  50 - 170 ug/dL Final    Transferrin 10/06/2022 299  180 - 382 mg/dL Final    Iron Binding Capacity Total 10/06/2022 336  250 - 450 ug/dL Final    Iron Saturation 10/06/2022 24  20 - 50 % Final    Ferritin Level 10/06/2022 15.75  4.63 - 204.00 ng/mL Final    WBC 10/06/2022 4.8  4.5 - 11.5 x10(3)/mcL Final    RBC 10/06/2022 4.37  4.20 - 5.40 x10(6)/mcL Final    Hgb 10/06/2022 12.9  12.0 - 16.0 gm/dL Final    Hct 10/06/2022 38.0  37.0 - 47.0 % Final    MCV 10/06/2022 87.0  80.0 - 94.0 fL Final    MCH 10/06/2022 29.5  27.0 - 31.0 pg Final    MCHC 10/06/2022 33.9  33.0 - 36.0 mg/dL Final    RDW 10/06/2022 13.1  11.5 - 17.0 % Final    Platelet 10/06/2022 411 (H)  130 - 400 x10(3)/mcL Final    MPV 10/06/2022 9.9  7.4 - 10.4 fL Final    Neut % 10/06/2022 51.2  % Final    Lymph % 10/06/2022 34.7  % Final    Mono % 10/06/2022 10.7  % Final    Eos % 10/06/2022 1.9  % Final    Basophil % 10/06/2022 1.1  % Final    Lymph # 10/06/2022 1.65  0.6 - 4.6 x10(3)/mcL Final    Neut # 10/06/2022 2.4  2.1 - 9.2 x10(3)/mcL Final    Mono # 10/06/2022 0.51  0.1 - 1.3 x10(3)/mcL Final    Eos # 10/06/2022 0.09  0 - 0.9 x10(3)/mcL Final    Baso # 10/06/2022 0.05  0 - 0.2 x10(3)/mcL Final    IG# 10/06/2022 0.02  0 - 0.04 x10(3)/mcL Final    IG% 10/06/2022 0.4  % Final    NRBC% 10/06/2022 0.0  % Final         Assessment/Plan:     1. Thrombocytosis - elevated and most likely secondary to anemia.     2. Iron deficiency Anemia - Resolved Hgb  12.9. S/p Feraheme on 10/19/20 and 10/26/20. She does report ongoing heavy menses, she will continue follow up with gynecologist for management of heavy menses. Educated on iron rich foods to incorporate in diet. No need for oral iron at this time as it causes her constipation. Iron sat is 24%.     PLAN:  RTC in 6 mos for NP visit with labs     Discussion:     I have explained all of the above in detail and the patient understands all of the current recommendation(s). I have answered all of their questions to the best of my ability and to their complete satisfaction.   The patient is to continue with the current management plan.            Electronically signed by Fela Garcia NP

## 2023-02-22 ENCOUNTER — OFFICE VISIT (OUTPATIENT)
Dept: GYNECOLOGY | Facility: CLINIC | Age: 48
End: 2023-02-22
Payer: MEDICAID

## 2023-02-22 VITALS
TEMPERATURE: 98 F | DIASTOLIC BLOOD PRESSURE: 81 MMHG | SYSTOLIC BLOOD PRESSURE: 119 MMHG | WEIGHT: 232 LBS | HEIGHT: 67 IN | HEART RATE: 69 BPM | RESPIRATION RATE: 20 BRPM | OXYGEN SATURATION: 100 % | BODY MASS INDEX: 36.41 KG/M2

## 2023-02-22 DIAGNOSIS — N93.9 ABNORMAL UTERINE BLEEDING (AUB): Primary | ICD-10-CM

## 2023-02-22 PROCEDURE — 99213 OFFICE O/P EST LOW 20 MIN: CPT | Mod: PBBFAC

## 2023-02-22 RX ORDER — IBUPROFEN 800 MG/1
800 TABLET ORAL 3 TIMES DAILY
Qty: 30 TABLET | Refills: 4 | Status: SHIPPED | OUTPATIENT
Start: 2023-02-22

## 2023-02-22 RX ORDER — TRANEXAMIC ACID 650 MG/1
1300 TABLET ORAL 3 TIMES DAILY
Qty: 30 TABLET | Refills: 11 | Status: SHIPPED | OUTPATIENT
Start: 2023-02-22

## 2023-02-22 NOTE — PROGRESS NOTES
[facility] OB/GYN CLINIC NOTE  [facility]  [address] [place] [address]  Phone: [phone]  Fax: [phone]    Subjective:     Ms. Graham Fredeirck is a 48 yo F who presents for f/u abnormal uterine bleeding. Last seen in 02/2022 and was started on TXA for heavy menses. Reports significant improvement in bleeding profile with medication although ran out of prescription a few months ago and has increasing bleeding with menses since then.   Followed by Heme/Onc for VENUS w/ H/H WNL. Takes PO Fe supplementation No recent IV Fe supplementation or Transfusions noted. Denies any CP, SOB, palpitations, light-headedness/dizziness. Followed by PCP for Hypothyroidism, compliant w/ Synthroid and reports that TSH WNL. Concerned about obesity.    MedHx:   Past Medical History:   Diagnosis Date    Anemia     Hypertension     Thyroid disease      SurgHx:   Past Surgical History:   Procedure Laterality Date    DILATION AND CURETTAGE OF UTERUS N/A     THYROIDECTOMY Bilateral      Medications:     Current Outpatient Medications:     amLODIPine (NORVASC) 10 MG tablet, Take 1 tablet (10 mg total) by mouth once daily., Disp: 90 tablet, Rfl: 3    ibuprofen (ADVIL,MOTRIN) 800 MG tablet, Take 1 tablet (800 mg total) by mouth 3 (three) times daily., Disp: 30 tablet, Rfl: 4    levothyroxine (SYNTHROID) 125 MCG tablet, Take 1 tablet (125 mcg total) by mouth once daily., Disp: 90 tablet, Rfl: 1    propranoloL (INDERAL) 10 MG tablet, Take 1 tablet (10 mg total) by mouth 2 (two) times daily., Disp: 180 tablet, Rfl: 3    tranexamic acid (LYSTEDA) 650 mg tablet, Take 2 tablets (1,300 mg total) by mouth 3 (three) times daily., Disp: 30 tablet, Rfl: 11    FM Hx: Denies hx of ovarian, uterine, endometrial, or colon cancer. Denies history of bleeding or coagulation disorders.  Family History   Problem Relation Age of Onset    Hypertension Mother     Cancer Father     Depression Father     Diabetes Father        Social Hx: Denies tobacco, alcohol and  "illicit drug usage.  Social History     Socioeconomic History    Marital status:    Tobacco Use    Smoking status: Never    Smokeless tobacco: Never   Substance and Sexual Activity    Alcohol use: Never    Drug use: Never    Sexual activity: Not Currently     Partners: Female       Review of Systems  Denies fevers, chills, headache, blurry vision, nausea, vomiting, dizziness, or syncope.   Denies chest pain, shortness of breath, RUQ pain, or calf pain.    Objective:     Vitals:    02/22/23 1337   BP: 119/81   Pulse: 69   Resp: 20   Temp: 98.1 °F (36.7 °C)   SpO2: 100%   Weight: 105.2 kg (232 lb)   Height: 5' 7" (1.702 m)     Body mass index is 36.34 kg/m².    Physical Exam:   General: alert and oriented, in no acute distress  Lungs: clear to auscultation bilaterally, no conversational dyspnea  Heart: regular rate and rhythm  Abdomen: Soft, non-distended, non tender  Extremities: Normal, atraumatic, non-edematous, No cords or calf tenderness, No significant calf/ankle edema  Note: RN chaperone present for entirety of exam.     Imaging  No images are attached to the encounter.  Assessment/Plan:    Graham Frederick is a 47 y.o.F who presents for follow up of abnormal uterine bleeding improved with TXA, however has been without medication for 3 months.    -Refill of TXA and NSAIDs sent to pharmacy.  -Patient still has not had pelvic us performed to assess for any structural causes of bleeding. Re-ordered and encouraged to complete  -Discussed healthier dietary options to help with weight loss along with moderate exercise and leafy greens/fiber to help with complaints of constipation    Future Appointments   Date Time Provider Department Center   3/27/2023  9:30 AM Kanchan Rosario MD [facility]   4/18/2023  9:00 AM [facility]   2/27/2024  1:40 PM GIOVANNI Clark [facility] GYN [facility]     Patient and plan were discussed with Dr. Feliciano.    Vineet Jade MD  [employer] " [facility], PGY-4

## 2023-02-27 ENCOUNTER — DOCUMENTATION ONLY (OUTPATIENT)
Dept: ADMINISTRATIVE | Facility: HOSPITAL | Age: 48
End: 2023-02-27
Payer: MEDICAID

## 2023-02-28 ENCOUNTER — HOSPITAL ENCOUNTER (OUTPATIENT)
Dept: RADIOLOGY | Facility: HOSPITAL | Age: 48
Discharge: HOME | End: 2023-02-28
Attending: STUDENT IN AN ORGANIZED HEALTH CARE EDUCATION/TRAINING PROGRAM
Payer: MEDICAID

## 2023-02-28 DIAGNOSIS — N93.9 ABNORMAL UTERINE BLEEDING (AUB): ICD-10-CM

## 2023-02-28 PROCEDURE — 76856 US EXAM PELVIC COMPLETE: CPT | Mod: TC

## 2023-07-29 ENCOUNTER — HOSPITAL ENCOUNTER (EMERGENCY)
Facility: HOSPITAL | Age: 48
Discharge: HOME | End: 2023-07-29
Attending: STUDENT IN AN ORGANIZED HEALTH CARE EDUCATION/TRAINING PROGRAM
Payer: MEDICAID

## 2023-07-29 VITALS
SYSTOLIC BLOOD PRESSURE: 153 MMHG | DIASTOLIC BLOOD PRESSURE: 88 MMHG | HEART RATE: 75 BPM | HEIGHT: 66 IN | BODY MASS INDEX: 32.14 KG/M2 | WEIGHT: 200 LBS | OXYGEN SATURATION: 100 % | RESPIRATION RATE: 16 BRPM | TEMPERATURE: 99 F

## 2023-07-29 DIAGNOSIS — N93.9 VAGINAL BLEEDING: Primary | ICD-10-CM

## 2023-07-29 LAB
BASOPHILS # BLD AUTO: 0.08 X10(3)/MCL
BASOPHILS NFR BLD AUTO: 1.3 %
EOSINOPHIL # BLD AUTO: 0.22 X10(3)/MCL (ref 0–0.9)
EOSINOPHIL NFR BLD AUTO: 3.5 %
ERYTHROCYTE [DISTWIDTH] IN BLOOD BY AUTOMATED COUNT: 14.5 % (ref 11.5–17)
HCT VFR BLD AUTO: 36.2 % (ref 37–47)
HGB BLD-MCNC: 11.7 G/DL (ref 12–16)
IMM GRANULOCYTES # BLD AUTO: 0.01 X10(3)/MCL (ref 0–0.04)
IMM GRANULOCYTES NFR BLD AUTO: 0.2 %
LYMPHOCYTES # BLD AUTO: 2.43 X10(3)/MCL (ref 0.6–4.6)
LYMPHOCYTES NFR BLD AUTO: 38.4 %
MCH RBC QN AUTO: 27.8 PG (ref 27–31)
MCHC RBC AUTO-ENTMCNC: 32.3 G/DL (ref 33–36)
MCV RBC AUTO: 86 FL (ref 80–94)
MONOCYTES # BLD AUTO: 0.59 X10(3)/MCL (ref 0.1–1.3)
MONOCYTES NFR BLD AUTO: 9.3 %
NEUTROPHILS # BLD AUTO: 3 X10(3)/MCL (ref 2.1–9.2)
NEUTROPHILS NFR BLD AUTO: 47.3 %
NRBC BLD AUTO-RTO: 0 %
PLATELET # BLD AUTO: 392 X10(3)/MCL (ref 130–400)
PMV BLD AUTO: 9.9 FL (ref 7.4–10.4)
RBC # BLD AUTO: 4.21 X10(6)/MCL (ref 4.2–5.4)
WBC # SPEC AUTO: 6.33 X10(3)/MCL (ref 4.5–11.5)

## 2023-07-29 PROCEDURE — 99283 EMERGENCY DEPT VISIT LOW MDM: CPT

## 2023-07-29 PROCEDURE — 85025 COMPLETE CBC W/AUTO DIFF WBC: CPT | Performed by: STUDENT IN AN ORGANIZED HEALTH CARE EDUCATION/TRAINING PROGRAM

## 2023-07-30 NOTE — ED NOTES
Pt seen and evaluated by Dr. Oconnor. Pt has no complaints at this time and verbalized understanding of education. Pt ambulated out of ER with steady gait.  No acute distress noted. See provider notes.

## 2023-07-30 NOTE — ED NOTES
"Pt arrived to Er via PV. Pt ambulated to ER room 2 with steady gait,. [place] of pt care at this time.  Pt states "I am having a bad period and my belly is hurting really bad." Pt reports changing pad q3 hrs and waking up "in puddles." Pt capillary refill <3. Pt acyanotic. No acute distress noted.     "

## 2023-07-30 NOTE — ED PROVIDER NOTES
Encounter Date: 7/29/2023       History     Chief Complaint   Patient presents with    Vaginal Bleeding     C/o blood clots and weakness with menstrual cycle. Pt states it is chronic and she has not recently seen a doctor for it. Pt states it is chronic and the last time she seen a doctor they put her on a medication for it but she does not remember the name.     46yo AAF past medical history anemia, hypertension, thyroid disease presented to ED for vaginal bleeding. Currently on her cycle, having blood clots, weakness. States usual for her not out of the ordinary.       Review of patient's allergies indicates:   Allergen Reactions    Iodine Swelling    Sulfamethoxazole-trimethoprim Swelling     Past Medical History:   Diagnosis Date    Anemia     Hypertension     Thyroid disease      Past Surgical History:   Procedure Laterality Date    DILATION AND CURETTAGE OF UTERUS N/A     THYROIDECTOMY Bilateral      Family History   Problem Relation Age of Onset    Hypertension Mother     Cancer Father     Depression Father     Diabetes Father      Social History     Tobacco Use    Smoking status: Never    Smokeless tobacco: Never   Substance Use Topics    Alcohol use: Never    Drug use: Never     Review of Systems   Constitutional:  Positive for fatigue. Negative for fever.   HENT:  Negative for sore throat.    Respiratory:  Negative for shortness of breath.    Cardiovascular:  Negative for chest pain.   Gastrointestinal:  Negative for nausea.   Genitourinary:  Positive for vaginal bleeding. Negative for dysuria.   Musculoskeletal:  Negative for back pain.   Skin:  Negative for rash.   Neurological:  Positive for weakness.   Hematological:  Does not bruise/bleed easily.       Physical Exam     Initial Vitals [07/29/23 2048]   BP Pulse Resp Temp SpO2   (!) 153/88 75 16 98.6 °F (37 °C) 100 %      MAP       --         Physical Exam    Vitals reviewed.  Constitutional: She appears well-developed and well-nourished.   HENT:    Head: Normocephalic and atraumatic.   Eyes: Conjunctivae are normal.   Neck: Neck supple.   Cardiovascular:  Normal rate and regular rhythm.           Pulmonary/Chest: Breath sounds normal.   Abdominal: Abdomen is soft. Bowel sounds are normal.   Musculoskeletal:         General: Normal range of motion.      Cervical back: Neck supple.     Neurological: She is alert and oriented to person, place, and time.   Skin: Skin is warm and dry. Capillary refill takes less than 2 seconds.   Psychiatric: She has a normal mood and affect. Thought content normal.         ED Course   Procedures  Labs Reviewed   CBC WITH DIFFERENTIAL - Abnormal; Notable for the following components:       Result Value    Hgb 11.7 (*)     Hct 36.2 (*)     MCHC 32.3 (*)     All other components within normal limits   CBC W/ AUTO DIFFERENTIAL    Narrative:     The following orders were created for panel order CBC auto differential.  Procedure                               Abnormality         Status                     ---------                               -----------         ------                     CBC with Differential[[id number]]        Abnormal            Final result                 Please view results for these tests on the individual orders.          Imaging Results    None          Medications - No data to display  Medical Decision Making:   Initial Assessment:   Vaginal bleeding  Differential Diagnosis:   Anemia, vaginal bleeding  Clinical Tests:   Lab Tests: Ordered and Reviewed  ED Management:  Reassurance given  Follow up with PCP, gynecologist  Return precautions given                          Clinical Impression:   Final diagnoses:  [N93.9] Vaginal bleeding (Primary)        ED Disposition Condition    Discharge Stable          ED Prescriptions    None       Follow-up Information       Follow up With Specialties Details Why Contact Info    [facility] - Emergency Dept Emergency Medicine  If symptoms worsen, As needed [address] [place] [id number]  [phone]    Gynecologist  Schedule an appointment as soon as possible for a visit                Jamaica Oconnor,   07/29/23 2223       Jamaica Oconnor,   07/29/23 2223

## 2023-08-10 ENCOUNTER — OFFICE VISIT (OUTPATIENT)
Dept: FAMILY MEDICINE | Facility: CLINIC | Age: 48
End: 2023-08-10
Payer: MEDICAID

## 2023-08-10 VITALS
SYSTOLIC BLOOD PRESSURE: 117 MMHG | RESPIRATION RATE: 18 BRPM | WEIGHT: 237 LBS | TEMPERATURE: 98 F | HEART RATE: 69 BPM | HEIGHT: 66 IN | DIASTOLIC BLOOD PRESSURE: 81 MMHG | OXYGEN SATURATION: 100 % | BODY MASS INDEX: 38.09 KG/M2

## 2023-08-10 DIAGNOSIS — Z12.31 ENCOUNTER FOR SCREENING MAMMOGRAM FOR MALIGNANT NEOPLASM OF BREAST: ICD-10-CM

## 2023-08-10 DIAGNOSIS — E03.9 HYPOTHYROIDISM, UNSPECIFIED TYPE: ICD-10-CM

## 2023-08-10 DIAGNOSIS — Z80.0 FAMILY HX OF COLON CANCER: ICD-10-CM

## 2023-08-10 DIAGNOSIS — D50.9 IRON DEFICIENCY ANEMIA, UNSPECIFIED IRON DEFICIENCY ANEMIA TYPE: ICD-10-CM

## 2023-08-10 DIAGNOSIS — I10 HYPERTENSION, UNSPECIFIED TYPE: Primary | ICD-10-CM

## 2023-08-10 DIAGNOSIS — Z12.11 SCREEN FOR COLON CANCER: ICD-10-CM

## 2023-08-10 DIAGNOSIS — E78.5 HYPERLIPIDEMIA, UNSPECIFIED HYPERLIPIDEMIA TYPE: ICD-10-CM

## 2023-08-10 PROCEDURE — 3079F PR MOST RECENT DIASTOLIC BLOOD PRESSURE 80-89 MM HG: ICD-10-PCS | Mod: CPTII,,, | Performed by: FAMILY MEDICINE

## 2023-08-10 PROCEDURE — 99214 OFFICE O/P EST MOD 30 MIN: CPT | Mod: PBBFAC | Performed by: FAMILY MEDICINE

## 2023-08-10 PROCEDURE — 1160F PR REVIEW ALL MEDS BY PRESCRIBER/CLIN PHARMACIST DOCUMENTED: ICD-10-PCS | Mod: CPTII,,, | Performed by: FAMILY MEDICINE

## 2023-08-10 PROCEDURE — 3074F SYST BP LT 130 MM HG: CPT | Mod: CPTII,,, | Performed by: FAMILY MEDICINE

## 2023-08-10 PROCEDURE — 1160F RVW MEDS BY RX/DR IN RCRD: CPT | Mod: CPTII,,, | Performed by: FAMILY MEDICINE

## 2023-08-10 PROCEDURE — 3079F DIAST BP 80-89 MM HG: CPT | Mod: CPTII,,, | Performed by: FAMILY MEDICINE

## 2023-08-10 PROCEDURE — 3008F PR BODY MASS INDEX (BMI) DOCUMENTED: ICD-10-PCS | Mod: CPTII,,, | Performed by: FAMILY MEDICINE

## 2023-08-10 PROCEDURE — 3008F BODY MASS INDEX DOCD: CPT | Mod: CPTII,,, | Performed by: FAMILY MEDICINE

## 2023-08-10 PROCEDURE — 1159F MED LIST DOCD IN RCRD: CPT | Mod: CPTII,,, | Performed by: FAMILY MEDICINE

## 2023-08-10 PROCEDURE — 1159F PR MEDICATION LIST DOCUMENTED IN MEDICAL RECORD: ICD-10-PCS | Mod: CPTII,,, | Performed by: FAMILY MEDICINE

## 2023-08-10 PROCEDURE — 99214 OFFICE O/P EST MOD 30 MIN: CPT | Mod: S$PBB,,, | Performed by: FAMILY MEDICINE

## 2023-08-10 PROCEDURE — 99214 PR OFFICE/OUTPT VISIT, EST, LEVL IV, 30-39 MIN: ICD-10-PCS | Mod: S$PBB,,, | Performed by: FAMILY MEDICINE

## 2023-08-10 PROCEDURE — 3074F PR MOST RECENT SYSTOLIC BLOOD PRESSURE < 130 MM HG: ICD-10-PCS | Mod: CPTII,,, | Performed by: FAMILY MEDICINE

## 2023-08-10 RX ORDER — AMLODIPINE BESYLATE 10 MG/1
10 TABLET ORAL DAILY
Qty: 90 TABLET | Refills: 1 | Status: SHIPPED | OUTPATIENT
Start: 2023-08-10

## 2023-08-10 RX ORDER — PROPRANOLOL HYDROCHLORIDE 10 MG/1
10 TABLET ORAL DAILY
Qty: 90 TABLET | Refills: 1 | Status: SHIPPED | OUTPATIENT
Start: 2023-08-10

## 2023-08-10 RX ORDER — LEVOTHYROXINE SODIUM 125 UG/1
125 TABLET ORAL DAILY
Qty: 90 TABLET | Refills: 1 | Status: SHIPPED | OUTPATIENT
Start: 2023-08-10

## 2023-08-10 NOTE — PROGRESS NOTES
"Patient Name: Graham Frederick   : 1975  MRN: [id number]     SUBJECTIVE:  Graham Frederick is a 47 y.o. female here for Follow-up (Medications)  .    HPI  Here for routine follow up/reestablish care. Previous pt of Dr. Hightower  past medical history of hypertension, hypothyroidism status post thyroidectomy, anemia, thrombocitosis.  Last TSH normal last year. On Levothyroxine 125 mcg  Amlodipine 10 mg for HTN and propranolol 10 mg BID but pt actually taking it once a day and bp is well controlled.    Follows with gynecology for regular well woman exams and AUB. Sees hem/onc for thrombocytosis- reactive to anemia. Used to be on iron pills but stopped a year ago or so, it was causing her constipation.    Paternal aunt with breast cancer.  Father and paternal grandfather with colon cancer. Colonoscopy 3-4 years ago as per patient, normal and was told to repeat in 2 years.      ALLERGIES:   Review of patient's allergies indicates:   Allergen Reactions    Iodine Swelling    Sulfamethoxazole-trimethoprim Swelling         ROS:  Review of Systems   Constitutional:  Negative for chills and fever.   HENT:  Negative for congestion.    Eyes:  Negative for blurred vision.   Respiratory:  Negative for cough and shortness of breath.    Cardiovascular:  Negative for chest pain, palpitations and leg swelling.   Gastrointestinal:  Negative for abdominal pain, blood in stool, diarrhea, nausea and vomiting.   Genitourinary:  Negative for dysuria and hematuria.   Neurological:  Negative for dizziness and headaches.   Psychiatric/Behavioral:  Negative for depression. The patient is not nervous/anxious.          OBJECTIVE:  Vital signs  Vitals:    08/10/23 1427   BP: 117/81   Pulse: 69   Resp: 18   Temp: 97.9 °F (36.6 °C)   TempSrc: Oral   SpO2: 100%   Weight: 107.5 kg (237 lb)   Height: 5' 6" (1.676 m)      Body mass index is 38.25 kg/m².    PHYSICAL EXAM:   Physical Exam  Vitals reviewed.   Constitutional:       General: She is not in acute " distress.     Appearance: Normal appearance. She is not ill-appearing.   HENT:      Head: Normocephalic and atraumatic.      Right Ear: External ear normal.      Left Ear: External ear normal.      Nose: Nose normal. No rhinorrhea.      Mouth/Throat:      Mouth: Mucous membranes are moist.   Eyes:      General: No scleral icterus.        Right eye: No discharge.         Left eye: No discharge.      Conjunctiva/sclera: Conjunctivae normal.      Pupils: Pupils are equal, round, and reactive to light.   Cardiovascular:      Rate and Rhythm: Normal rate and regular rhythm.   Pulmonary:      Effort: Pulmonary effort is normal. No respiratory distress.      Breath sounds: No wheezing, rhonchi or rales.   Abdominal:      General: Bowel sounds are normal. There is no distension.      Palpations: Abdomen is soft.      Tenderness: There is no abdominal tenderness.   Musculoskeletal:      Cervical back: Normal range of motion and neck supple. No rigidity or tenderness.      Right lower leg: No edema.      Left lower leg: No edema.   Skin:     General: Skin is warm.      Coloration: Skin is not pale.      Findings: No rash.   Neurological:      General: No focal deficit present.      Mental Status: She is alert and oriented to person, place, and time.   Psychiatric:         Mood and Affect: Mood normal.         Behavior: Behavior normal.        ASSESSMENT/PLAN:  1. Hypertension, unspecified type  -     amLODIPine (NORVASC) 10 MG tablet; Take 1 tablet (10 mg total) by mouth once daily.  Dispense: 90 tablet; Refill: 1  -     propranoloL (INDERAL) 10 MG tablet; Take 1 tablet (10 mg total) by mouth Daily.  Dispense: 90 tablet; Refill: 1  -     CBC Auto Differential; Future; Expected date: 08/10/2023  -     Comprehensive Metabolic Panel; Future; Expected date: 08/10/2023  -     Hemoglobin A1C; Future; Expected date: 08/10/2023    2. Hypothyroidism, unspecified type  -     levothyroxine (SYNTHROID) 125 MCG tablet; Take 1 tablet (125  mcg total) by mouth once daily.  Dispense: 90 tablet; Refill: 1  -     TSH; Future; Expected date: 08/10/2023  -     CBC Auto Differential; Future; Expected date: 08/10/2023  -     Comprehensive Metabolic Panel; Future; Expected date: 08/10/2023    3. Iron deficiency anemia, unspecified iron deficiency anemia type  -     CBC Auto Differential; Future; Expected date: 08/10/2023  -     Iron and TIBC; Future; Expected date: 08/10/2023  -     Ferritin; Future; Expected date: 08/10/2023    4. Hyperlipidemia, unspecified hyperlipidemia type  -     Lipid Panel; Future; Expected date: 08/10/2023  -     Hemoglobin A1C; Future; Expected date: 08/10/2023    5. Encounter for screening mammogram for malignant neoplasm of breast  -     Mammo Digital Screening Bilat w/ Nikolai; Future; Expected date: 08/10/2023    6. Screen for colon cancer  -     Ambulatory referral/consult to Gastroenterology; Future; Expected date: 08/17/2023    7. Family hx of colon cancer  -     Ambulatory referral/consult to Gastroenterology; Future; Expected date: 08/17/2023       PLAN  - well controlled HTN. Continue with amlodipine 10 mg qd and metoprolol daily. Check basic labs  - well controlled hypothyroidism but no recent TSH. Recheck TSH. Continue with levothyroxine 125 mcg in the meantime. Will adjust if needed.  - recheck lipid panel. The 10-year ASCVD risk score (Mike BRADFORD, et al., 2019) is: 1.5%    Values used to calculate the score:      Age: 47 years      Sex: Female      Is Non- : Yes      Diabetic: No      Tobacco smoker: No      Systolic Blood Pressure: 117 mmHg      Is BP treated: Yes      HDL Cholesterol: 56 mg/dL      Total Cholesterol: 210 mg/dL  Lifestyle changes first, before considering medication.  -mammogram ordered. Referred to GI for colon cancer screening, FH of colon cancer.      Previous medical history/lab work/radiology reviewed and considered during medical management decisions.   Medication list  reviewed and medication reconciliation performed.  Patient was provided  and care about his/her current diagnosis (es) and medications including risk/benefit and side effects/adverse events, over the counter medication uses/doses, home self-care and contact precautions,  and red flags and indications for when to seek immediate medical attention.   Patient was advised to continue compliance with current medication list and medical recommendations.  Recommended/ Advised continued compliance with recommended eating habits/ diets for medical conditions and exercise 150 minutes/ week (if possible) for medical condition (s).        RESULTS:  Recent Results (from the past 1008 hour(s))   CBC with Differential    Collection Time: 07/29/23  9:54 PM   Result Value Ref Range    WBC 6.33 4.50 - 11.50 x10(3)/mcL    RBC 4.21 4.20 - 5.40 x10(6)/mcL    Hgb 11.7 (L) 12.0 - 16.0 g/dL    Hct 36.2 (L) 37.0 - 47.0 %    MCV 86.0 80.0 - 94.0 fL    MCH 27.8 27.0 - 31.0 pg    MCHC 32.3 (L) 33.0 - 36.0 g/dL    RDW 14.5 11.5 - 17.0 %    Platelet 392 130 - 400 x10(3)/mcL    MPV 9.9 7.4 - 10.4 fL    Neut % 47.3 %    Lymph % 38.4 %    Mono % 9.3 %    Eos % 3.5 %    Basophil % 1.3 %    Lymph # 2.43 0.6 - 4.6 x10(3)/mcL    Neut # 3.00 2.1 - 9.2 x10(3)/mcL    Mono # 0.59 0.1 - 1.3 x10(3)/mcL    Eos # 0.22 0 - 0.9 x10(3)/mcL    Baso # 0.08 <=0.2 x10(3)/mcL    IG# 0.01 0 - 0.04 x10(3)/mcL    IG% 0.2 %    NRBC% 0.0 %         Follow Up:  Follow up in about 6 months (around 2/10/2024) for htn.     [unfilled]    This note was created with the assistance of a voice recognition software or phone dictation. There may be transcription errors as a result of using this technology however minimal. Effort has been made to assure accuracy of transcription but any obvious errors or omissions should be clarified with the author of the document

## 2023-08-17 ENCOUNTER — HOSPITAL ENCOUNTER (OUTPATIENT)
Dept: RADIOLOGY | Facility: HOSPITAL | Age: 48
Discharge: HOME | End: 2023-08-17
Attending: FAMILY MEDICINE
Payer: MEDICAID

## 2023-08-17 DIAGNOSIS — Z12.31 ENCOUNTER FOR SCREENING MAMMOGRAM FOR MALIGNANT NEOPLASM OF BREAST: ICD-10-CM

## 2023-08-17 PROCEDURE — 77067 SCR MAMMO BI INCL CAD: CPT | Mod: TC

## 2023-08-17 PROCEDURE — 77067 MAMMO DIGITAL SCREENING BILAT WITH TOMO: ICD-10-PCS | Mod: 26,,, | Performed by: RADIOLOGY

## 2023-08-17 PROCEDURE — 77067 SCR MAMMO BI INCL CAD: CPT | Mod: 26,,, | Performed by: RADIOLOGY

## 2023-08-17 PROCEDURE — 77063 BREAST TOMOSYNTHESIS BI: CPT | Mod: 26,,, | Performed by: RADIOLOGY

## 2023-08-17 PROCEDURE — 77063 MAMMO DIGITAL SCREENING BILAT WITH TOMO: ICD-10-PCS | Mod: 26,,, | Performed by: RADIOLOGY

## 2023-11-24 ENCOUNTER — HOSPITAL ENCOUNTER (EMERGENCY)
Facility: HOSPITAL | Age: 48
Discharge: HOME | End: 2023-11-24
Attending: STUDENT IN AN ORGANIZED HEALTH CARE EDUCATION/TRAINING PROGRAM
Payer: MEDICAID

## 2023-11-24 VITALS
HEART RATE: 78 BPM | TEMPERATURE: 98 F | HEIGHT: 66 IN | BODY MASS INDEX: 32.95 KG/M2 | SYSTOLIC BLOOD PRESSURE: 141 MMHG | OXYGEN SATURATION: 100 % | RESPIRATION RATE: 20 BRPM | WEIGHT: 205 LBS | DIASTOLIC BLOOD PRESSURE: 98 MMHG

## 2023-11-24 DIAGNOSIS — J02.9 VIRAL PHARYNGITIS: ICD-10-CM

## 2023-11-24 DIAGNOSIS — J06.9 VIRAL URI WITH COUGH: Primary | ICD-10-CM

## 2023-11-24 LAB
FLUAV AG UPPER RESP QL IA.RAPID: NOT DETECTED
FLUBV AG UPPER RESP QL IA.RAPID: NOT DETECTED
SARS-COV-2 RNA RESP QL NAA+PROBE: NOT DETECTED
STREP A PCR (OHS): NOT DETECTED

## 2023-11-24 PROCEDURE — 87651 STREP A DNA AMP PROBE: CPT | Performed by: STUDENT IN AN ORGANIZED HEALTH CARE EDUCATION/TRAINING PROGRAM

## 2023-11-24 PROCEDURE — 96372 THER/PROPH/DIAG INJ SC/IM: CPT | Performed by: STUDENT IN AN ORGANIZED HEALTH CARE EDUCATION/TRAINING PROGRAM

## 2023-11-24 PROCEDURE — 63600175 PHARM REV CODE 636 W HCPCS: Performed by: STUDENT IN AN ORGANIZED HEALTH CARE EDUCATION/TRAINING PROGRAM

## 2023-11-24 PROCEDURE — 0240U COVID/FLU A&B PCR: CPT | Performed by: STUDENT IN AN ORGANIZED HEALTH CARE EDUCATION/TRAINING PROGRAM

## 2023-11-24 PROCEDURE — 99284 EMERGENCY DEPT VISIT MOD MDM: CPT

## 2023-11-24 RX ORDER — DEXAMETHASONE SODIUM PHOSPHATE 4 MG/ML
8 INJECTION, SOLUTION INTRA-ARTICULAR; INTRALESIONAL; INTRAMUSCULAR; INTRAVENOUS; SOFT TISSUE
Status: COMPLETED | OUTPATIENT
Start: 2023-11-24 | End: 2023-11-24

## 2023-11-24 RX ORDER — FLUTICASONE PROPIONATE 50 MCG
1 SPRAY, SUSPENSION (ML) NASAL DAILY
Qty: 15 G | Refills: 0 | OUTPATIENT
Start: 2023-11-24 | End: 2024-03-28

## 2023-11-24 RX ADMIN — DEXAMETHASONE SODIUM PHOSPHATE 8 MG: 4 INJECTION, SOLUTION INTRA-ARTICULAR; INTRALESIONAL; INTRAMUSCULAR; INTRAVENOUS; SOFT TISSUE at 02:11

## 2023-11-24 NOTE — ED PROVIDER NOTES
Encounter Date: 11/24/2023       History     Chief Complaint   Patient presents with    Cough    Sore Throat     Cough and sore throat started yesterday.      HPI    48-year-old female with a past medical history of hypertension hypothyroidism presents emergency department for cough and sore throat that started yesterday.  Patient states she is been around people at work that has been sick.  States that her nose is runny as well.  No shortness of breath.  No fevers.    Review of patient's allergies indicates:   Allergen Reactions    Iodine Swelling    Sulfamethoxazole-trimethoprim Swelling     Past Medical History:   Diagnosis Date    Anemia     Hypertension     Thyroid disease      Past Surgical History:   Procedure Laterality Date    DILATION AND CURETTAGE OF UTERUS N/A     THYROIDECTOMY Bilateral      Family History   Problem Relation Age of Onset    Hypertension Mother     Cancer Father     Depression Father     Diabetes Father      Social History     Tobacco Use    Smoking status: Never    Smokeless tobacco: Never   Substance Use Topics    Alcohol use: Never    Drug use: Never     Review of Systems   Constitutional:  Negative for fever.   HENT:  Positive for congestion and sore throat.    Respiratory:  Positive for cough. Negative for shortness of breath.    Cardiovascular:  Negative for chest pain.   Gastrointestinal:  Negative for abdominal pain, constipation, diarrhea, nausea and vomiting.   Genitourinary:  Negative for dysuria.   Musculoskeletal:  Negative for back pain.   Skin:  Negative for rash.   Neurological:  Negative for weakness and headaches.   Hematological:  Does not bruise/bleed easily.   All other systems reviewed and are negative.      Physical Exam     Initial Vitals [11/24/23 1349]   BP Pulse Resp Temp SpO2   (!) 132/99 70 18 97.5 °F (36.4 °C) 100 %      MAP       --         Physical Exam    Nursing note and vitals reviewed.  Constitutional: She appears well-developed and well-nourished. No  distress.   HENT:   Right Ear: External ear normal.   Left Ear: External ear normal.   Mouth/Throat: No oropharyngeal exudate.   Posterior oropharyngeal erythema with no exudates   Cardiovascular:  Normal rate and regular rhythm.           Pulmonary/Chest: Breath sounds normal. No respiratory distress. She has no wheezes. She has no rhonchi. She has no rales.   Abdominal: Abdomen is soft. There is no abdominal tenderness. There is no rebound and no guarding.   Musculoskeletal:         General: No tenderness. Normal range of motion.     Neurological: She is alert and oriented to person, place, and time. She has normal strength.   Skin: Skin is warm. Capillary refill takes less than 2 seconds.         ED Course   Procedures  Labs Reviewed   COVID/FLU A&B PCR - Normal    Narrative:     The Xpert Xpress SARS-CoV-2/FLU/RSV plus is a rapid, multiplexed real-time PCR test intended for the simultaneous qualitative detection and differentiation of SARS-CoV-2, Influenza A, Influenza B, and respiratory syncytial virus (RSV) viral RNA in either nasopharyngeal swab or nasal swab specimens.         STREP GROUP A BY PCR - Normal    Narrative:     The Xpert Xpress Strep A test is a rapid, qualitative in vitro diagnostic test for the detection of Streptococcus pyogenes (Group A ß-hemolytic Streptococcus, Strep A) in throat swab specimens from patients with signs and symptoms of pharyngitis.            Imaging Results    None          Medications   dexAMETHasone injection 8 mg (has no administration in time range)     Medical Decision Making  differential diagnosis  Viral syndrome, COVID, flu, strep,  as well as multiple other possible etiologies      Problems Addressed:  Viral pharyngitis: acute illness or injury  Viral URI with cough: acute illness or injury    Amount and/or Complexity of Data Reviewed  Labs: ordered. Decision-making details documented in ED Course.    Risk  Prescription drug management.               ED Course as  of 11/24/23 1436   Fri Nov 24, 2023   1435 SARS-CoV2 (COVID-19) Qualitative PCR: Not Detected [BS]   1435 Influenza B, Molecular: Not Detected [BS]   1435 Influenza A, Molecular: Not Detected [BS]   1435 STREP A PCR ([facility]): Not Detected [BS]      ED Course User Index  [BS] Christiano Philip MD                        Clinical Impression:  Final diagnoses:  [J06.9] Viral URI with cough (Primary)  [J02.9] Viral pharyngitis          ED Disposition Condition    Discharge Stable          ED Prescriptions       Medication Sig Dispense Start Date End Date Auth. Provider    fluticasone propionate (FLONASE) 50 mcg/actuation nasal spray 1 spray (50 mcg total) by Each Nostril route once daily. 15 g 11/24/2023 -- Christiano Philip MD          Follow-up Information       Follow up With Specialties Details Why Contact Info    Kanchan Rosario MD Family Medicine Schedule an appointment as soon as possible for a visit   [address] [facility] [address]  [phone]      [facility] - Emergency Dept Emergency Medicine Go to  If symptoms worsen [address] [place] [id number] [phone]             Christiano Philip MD  11/24/23 1436

## 2023-11-24 NOTE — ED NOTES
Pt to Ed with c/o sore throat since yesterday. Denies cough, congestion and fever. Pt appears in no respiratory distress.

## 2024-03-28 ENCOUNTER — HOSPITAL ENCOUNTER (EMERGENCY)
Facility: HOSPITAL | Age: 49
Discharge: HOME | End: 2024-03-28
Attending: GENERAL PRACTICE
Payer: MEDICAID

## 2024-03-28 VITALS
OXYGEN SATURATION: 99 % | SYSTOLIC BLOOD PRESSURE: 149 MMHG | WEIGHT: 198.44 LBS | DIASTOLIC BLOOD PRESSURE: 96 MMHG | BODY MASS INDEX: 31.89 KG/M2 | HEIGHT: 66 IN | RESPIRATION RATE: 19 BRPM | TEMPERATURE: 97 F | HEART RATE: 74 BPM

## 2024-03-28 DIAGNOSIS — J06.9 VIRAL URI WITH COUGH: Primary | ICD-10-CM

## 2024-03-28 DIAGNOSIS — R09.81 NASAL CONGESTION: ICD-10-CM

## 2024-03-28 PROCEDURE — 0240U COVID/FLU A&B PCR: CPT | Performed by: GENERAL PRACTICE

## 2024-03-28 PROCEDURE — 25000003 PHARM REV CODE 250: Performed by: GENERAL PRACTICE

## 2024-03-28 PROCEDURE — 99283 EMERGENCY DEPT VISIT LOW MDM: CPT

## 2024-03-28 PROCEDURE — 87651 STREP A DNA AMP PROBE: CPT | Performed by: GENERAL PRACTICE

## 2024-03-28 RX ORDER — BENZONATATE 100 MG/1
200 CAPSULE ORAL 3 TIMES DAILY PRN
Status: DISCONTINUED | OUTPATIENT
Start: 2024-03-28 | End: 2024-03-28 | Stop reason: HOSPADM

## 2024-03-28 RX ORDER — FLUTICASONE PROPIONATE 50 MCG
1 SPRAY, SUSPENSION (ML) NASAL 2 TIMES DAILY PRN
Qty: 15 G | Refills: 0 | Status: SHIPPED | OUTPATIENT
Start: 2024-03-28 | End: 2024-05-17

## 2024-03-28 RX ORDER — BENZONATATE 100 MG/1
200 CAPSULE ORAL 3 TIMES DAILY PRN
Qty: 20 CAPSULE | Refills: 0 | Status: SHIPPED | OUTPATIENT
Start: 2024-03-28 | End: 2024-04-07

## 2024-03-28 RX ADMIN — BENZONATATE 200 MG: 100 CAPSULE ORAL at 05:03

## 2024-03-28 NOTE — ED PROVIDER NOTES
Encounter Date: 3/28/2024       History     Chief Complaint   Patient presents with    Cough     C/o cough and sore throat x 1 week, denies fever, c/o malaise, c/o bodyaches     C/o cough and sore throat x 1 week, denies fever, c/o malaise, c/o bodyaches    The history is provided by the patient.   Cough  This is a chronic problem. The current episode started several days ago. The problem has been waxing and waning. The cough is Non-productive. There has been no fever. Associated symptoms include rhinorrhea and sore throat. She is not a smoker.     Review of patient's allergies indicates:   Allergen Reactions    Iodine Swelling    Sulfamethoxazole-trimethoprim Swelling     Past Medical History:   Diagnosis Date    Hypertension     Iron deficiency anemia     Thyroid disease      Past Surgical History:   Procedure Laterality Date    DILATION AND CURETTAGE OF UTERUS N/A     THYROIDECTOMY Bilateral      Family History   Problem Relation Age of Onset    Hypertension Mother     Cancer Father     Depression Father     Diabetes Father      Social History     Tobacco Use    Smoking status: Never    Smokeless tobacco: Never   Substance Use Topics    Alcohol use: Never    Drug use: Never     Review of Systems   Constitutional: Negative.    HENT:  Positive for rhinorrhea and sore throat.    Eyes: Negative.    Respiratory:  Positive for cough.    Cardiovascular: Negative.    Gastrointestinal: Negative.    Endocrine: Negative.    Genitourinary: Negative.    Musculoskeletal: Negative.    Skin: Negative.    Allergic/Immunologic: Negative.    Neurological: Negative.    Hematological: Negative.    Psychiatric/Behavioral: Negative.     All other systems reviewed and are negative.      Physical Exam     Initial Vitals [03/28/24 1615]   BP Pulse Resp Temp SpO2   (!) 149/96 78 17 97.3 °F (36.3 °C) 100 %      MAP       --         Physical Exam    Nursing note and vitals reviewed.  Constitutional: She appears well-developed and  well-nourished.   HENT:   Head: Normocephalic and atraumatic.   Eyes: EOM are normal. Pupils are equal, round, and reactive to light.   Neck: Neck supple.   Normal range of motion.  Cardiovascular:  Normal rate, regular rhythm, normal heart sounds and intact distal pulses.           Pulmonary/Chest: Breath sounds normal.   Abdominal: Abdomen is soft. Bowel sounds are normal.   Musculoskeletal:         General: Normal range of motion.      Cervical back: Normal range of motion and neck supple.     Neurological: She is alert and oriented to person, place, and time. She has normal strength. GCS score is 15. GCS eye subscore is 4. GCS verbal subscore is 5. GCS motor subscore is 6.   Skin: Skin is warm and dry.   Psychiatric: She has a normal mood and affect. Her behavior is normal. Judgment and thought content normal.         ED Course   Procedures  Labs Reviewed   STREP GROUP A BY PCR - Normal    Narrative:     The Xpert Xpress Strep A test is a rapid, qualitative in vitro diagnostic test for the detection of Streptococcus pyogenes (Group A ß-hemolytic Streptococcus, Strep A) in throat swab specimens from patients with signs and symptoms of pharyngitis.     COVID/FLU A&B PCR - Normal    Narrative:     The Xpert Xpress SARS-CoV-2/FLU/RSV plus is a rapid, multiplexed real-time PCR test intended for the simultaneous qualitative detection and differentiation of SARS-CoV-2, Influenza A, Influenza B, and respiratory syncytial virus (RSV) viral RNA in either nasopharyngeal swab or nasal swab specimens.                Imaging Results    None          Medications   benzonatate capsule 200 mg (has no administration in time range)     Medical Decision Making    Workup is normal.  The patient is having substantial rhinorrhea which is causing the cough as well as a sore throat.  We will prescribe Flonase as well as anti cough medicine.  I advised the patient follow up with her primary care physician    Amount and/or Complexity of  Data Reviewed  Labs: ordered.    Risk  Prescription drug management.                                      Clinical Impression:  Final diagnoses:  [J06.9] Viral URI with cough (Primary)  [R09.81] Nasal congestion          ED Disposition Condition    Discharge Stable          ED Prescriptions       Medication Sig Dispense Start Date End Date Auth. Provider    benzonatate (TESSALON) 100 MG capsule Take 2 capsules (200 mg total) by mouth 3 (three) times daily as needed for Cough (Do not crush or chew, swallow whole). 20 capsule 3/28/2024 4/7/2024 Nico Marin MD    fluticasone propionate (FLONASE) 50 mcg/actuation nasal spray 1 spray (50 mcg total) by Each Nostril route 2 (two) times daily as needed for Rhinitis. 15 g 3/28/2024 -- Nico Marin MD          Follow-up Information       Follow up With Specialties Details Why Contact Info    Kanchan Rosario MD Family Medicine Call in 3 days If symptoms worsen [address] [facility] [id number] [phone]               Nico Marin MD  03/28/24 4874

## 2024-04-08 DIAGNOSIS — E03.9 HYPOTHYROIDISM, UNSPECIFIED TYPE: ICD-10-CM

## 2024-04-08 DIAGNOSIS — I10 HYPERTENSION, UNSPECIFIED TYPE: ICD-10-CM

## 2024-04-08 RX ORDER — AMLODIPINE BESYLATE 10 MG/1
10 TABLET ORAL
Qty: 30 TABLET | Refills: 1 | Status: SHIPPED | OUTPATIENT
Start: 2024-04-08

## 2024-04-08 RX ORDER — LEVOTHYROXINE SODIUM 125 UG/1
125 TABLET ORAL
Qty: 30 TABLET | Refills: 1 | Status: SHIPPED | OUTPATIENT
Start: 2024-04-08

## 2024-04-08 RX ORDER — PROPRANOLOL HYDROCHLORIDE 10 MG/1
10 TABLET ORAL
Qty: 30 TABLET | Refills: 1 | Status: SHIPPED | OUTPATIENT
Start: 2024-04-08

## 2024-05-17 ENCOUNTER — OFFICE VISIT (OUTPATIENT)
Dept: FAMILY MEDICINE | Facility: CLINIC | Age: 49
End: 2024-05-17
Payer: MEDICAID

## 2024-05-17 VITALS
HEART RATE: 59 BPM | WEIGHT: 222.38 LBS | DIASTOLIC BLOOD PRESSURE: 83 MMHG | SYSTOLIC BLOOD PRESSURE: 124 MMHG | BODY MASS INDEX: 35.74 KG/M2 | RESPIRATION RATE: 18 BRPM | HEIGHT: 66 IN | OXYGEN SATURATION: 99 %

## 2024-05-17 DIAGNOSIS — E05.00 GRAVES' DISEASE: Primary | ICD-10-CM

## 2024-05-17 DIAGNOSIS — E66.01 CLASS 2 SEVERE OBESITY DUE TO EXCESS CALORIES WITH SERIOUS COMORBIDITY AND BODY MASS INDEX (BMI) OF 35.0 TO 35.9 IN ADULT: Chronic | ICD-10-CM

## 2024-05-17 DIAGNOSIS — D50.8 IRON DEFICIENCY ANEMIA SECONDARY TO INADEQUATE DIETARY IRON INTAKE: Chronic | ICD-10-CM

## 2024-05-17 DIAGNOSIS — K59.09 CHRONIC CONSTIPATION: ICD-10-CM

## 2024-05-17 DIAGNOSIS — I10 PRIMARY HYPERTENSION: Chronic | ICD-10-CM

## 2024-05-17 PROBLEM — R00.0 TACHYCARDIA: Status: RESOLVED | Noted: 2024-05-17 | Resolved: 2024-05-17

## 2024-05-17 PROBLEM — E66.9 OBESITY: Status: ACTIVE | Noted: 2024-05-17

## 2024-05-17 PROBLEM — E66.812 CLASS 2 SEVERE OBESITY DUE TO EXCESS CALORIES WITH SERIOUS COMORBIDITY AND BODY MASS INDEX (BMI) OF 35.0 TO 35.9 IN ADULT: Chronic | Status: ACTIVE | Noted: 2024-05-17

## 2024-05-17 PROBLEM — R00.0 TACHYCARDIA: Status: ACTIVE | Noted: 2024-05-17

## 2024-05-17 PROBLEM — E61.1 IRON DEFICIENCY: Status: ACTIVE | Noted: 2024-05-17

## 2024-05-17 PROCEDURE — 3008F BODY MASS INDEX DOCD: CPT | Mod: CPTII,,, | Performed by: FAMILY MEDICINE

## 2024-05-17 PROCEDURE — 3074F SYST BP LT 130 MM HG: CPT | Mod: CPTII,,, | Performed by: FAMILY MEDICINE

## 2024-05-17 PROCEDURE — 3079F DIAST BP 80-89 MM HG: CPT | Mod: CPTII,,, | Performed by: FAMILY MEDICINE

## 2024-05-17 PROCEDURE — 99214 OFFICE O/P EST MOD 30 MIN: CPT | Mod: ,,, | Performed by: FAMILY MEDICINE

## 2024-05-17 PROCEDURE — 1159F MED LIST DOCD IN RCRD: CPT | Mod: CPTII,,, | Performed by: FAMILY MEDICINE

## 2024-05-17 PROCEDURE — 1160F RVW MEDS BY RX/DR IN RCRD: CPT | Mod: CPTII,,, | Performed by: FAMILY MEDICINE

## 2024-05-17 NOTE — PROGRESS NOTES
Patient ID: [id number]     Chief Complaint: Establish Care    HPI:     Graham Frederick is a 48 y.o. female here today for Establish Care. Graves disease, chronic constipation, HTN. Patient also has depression but does not wish to start medication at this time. She also insomnia with difficulty both falling asleep and staying asleep.     Patient states she has a bowel movement approximately every 3 weeks.       -------------------------------------    Graves' disease    HTN (hypertension)    Hypothyroidism    Iron deficiency anemia    Thrombocytosis    Thyroid disease        Past Surgical History:   Procedure Laterality Date    DILATION AND CURETTAGE OF UTERUS      THYROIDECTOMY Bilateral 06/29/2018    Dr Radha Hicks       Review of patient's allergies indicates:   Allergen Reactions    Iodine Swelling    Sulfamethoxazole-trimethoprim Swelling       Outpatient Medications Marked as Taking for the 5/17/24 encounter (Office Visit) with Peña Morton, DO   Medication Sig Dispense Refill    amLODIPine (NORVASC) 10 MG tablet TAKE 1 TABLET BY MOUTH EVERY DAY 30 tablet 1    levothyroxine (SYNTHROID) 125 MCG tablet TAKE 1 TABLET BY MOUTH ONCE DAILY. 30 tablet 1    propranoloL (INDERAL) 10 MG tablet TAKE 1 TABLET BY MOUTH EVERY DAY 30 tablet 1       Social History     Socioeconomic History    Marital status:    Tobacco Use    Smoking status: Never    Smokeless tobacco: Never   Substance and Sexual Activity    Alcohol use: Never    Drug use: Never    Sexual activity: Not Currently     Partners: Female     Social Determinants of Health     Financial Resource Strain: Low Risk  (5/17/2024)    Overall Financial Resource Strain (CARDIA)     Difficulty of Paying Living Expenses: Not hard at all   Food Insecurity: No Food Insecurity (5/17/2024)    Hunger Vital Sign     Worried About Running Out of Food in the Last Year: Never true     Ran Out of Food in the Last Year: Never true   Transportation Needs: No Transportation  "Needs (5/17/2024)    TRANSPORTATION NEEDS     Transportation : No   Physical Activity: Sufficiently Active (5/17/2024)    Exercise Vital Sign     Days of Exercise per Week: 7 days     Minutes of Exercise per Session: 30 min   Stress: Stress Concern Present (5/17/2024)    [employer] [place] [employer] - Occupational Stress Questionnaire     Feeling of Stress : To some extent   Housing Stability: Low Risk  (5/17/2024)    Housing Stability Vital Sign     Unable to Pay for Housing in the Last Year: No     Homeless in the Last Year: No        Family History   Problem Relation Name Age of Onset    Hypertension Mother Ever hitchcock     Diabetes Mother Ever hitchcock     Prostate cancer Father Maritza hitchcock     Depression Father Maritza hitchcock     Diabetes Father Maritza hitchcock     Deep vein thrombosis Father Maritza hitchcock         Patient Care Team:  Peña Morton DO as PCP - General (Family Medicine)     Subjective:     Review of Systems   Constitutional:  Negative for chills and fever.   Respiratory:  Negative for shortness of breath.    Cardiovascular:  Negative for chest pain.   Gastrointestinal:  Positive for constipation. Negative for diarrhea.   Neurological:  Negative for headaches.   Psychiatric/Behavioral:  Positive for depression. The patient has insomnia. The patient is not nervous/anxious.        See HPI for details  All Other ROS: Negative except as stated in HPI.       Objective:     /83 (BP Location: Left arm, Patient Position: Sitting, BP Method: Large (Automatic))   Pulse (!) 59   Resp 18   Ht 5' 6" (1.676 m)   Wt 100.9 kg (222 lb 6.4 oz)   SpO2 99%   BMI 35.90 kg/m²     Physical Exam  Vitals reviewed.   Constitutional:       General: She is not in acute distress.     Appearance: Normal appearance. She is obese.   Cardiovascular:      Rate and Rhythm: Normal rate and regular rhythm.      Heart sounds: No murmur heard.     No friction rub. No gallop.   Pulmonary:      Effort: No " respiratory distress.      Breath sounds: No wheezing, rhonchi or rales.   Musculoskeletal:         General: No swelling, tenderness or deformity.      Right lower leg: No edema.      Left lower leg: No edema.   Skin:     General: Skin is warm and dry.      Findings: No lesion or rash.   Neurological:      General: No focal deficit present.      Mental Status: She is alert.   Psychiatric:         Mood and Affect: Mood normal.         Assessment/Plan:     1. Graves' disease  -     TSH; Future; Expected date: 05/17/2024  -     T4, Free; Future; Expected date: 05/17/2024    2. Chronic constipation  -     linaCLOtide (LINZESS) 145 mcg Cap capsule; Take 1 capsule (145 mcg total) by mouth before breakfast.  Dispense: 30 capsule; Refill: 5    3. Class 2 severe obesity due to excess calories with serious comorbidity and body mass index (BMI) of 35.0 to 35.9 in adult  -Will check thyroid function and address chronic constipation before prescribing medications such as Adipex.   4. Primary hypertension  -     Comprehensive Metabolic Panel; Future; Expected date: 05/17/2024  -     Lipid Panel; Future; Expected date: 05/17/2024  -     Urinalysis; Future; Expected date: 05/17/2024    5. Iron deficiency anemia secondary to inadequate dietary iron intake  -     CBC Auto Differential; Future; Expected date: 05/17/2024  -     Iron and TIBC; Future; Expected date: 05/17/2024  -     Ferritin; Future; Expected date: 05/17/2024  -     Reticulocytes; Future; Expected date: 05/17/2024  -     Path Review, Peripheral Smear; Future; Expected date: 05/17/2024  -She would be a poor candidate for oral iron due to chronic constipation.      Follow up:     Follow up in about 6 months (around 11/17/2024) for Wellness. In addition to their scheduled follow up, the patient has also been instructed to follow up on as needed basis.

## 2025-02-13 ENCOUNTER — HOSPITAL ENCOUNTER (EMERGENCY)
Facility: HOSPITAL | Age: 50
Discharge: HOME | End: 2025-02-13
Attending: GENERAL PRACTICE
Payer: MEDICAID

## 2025-02-13 VITALS
BODY MASS INDEX: 30.01 KG/M2 | HEIGHT: 68 IN | HEART RATE: 70 BPM | WEIGHT: 198 LBS | SYSTOLIC BLOOD PRESSURE: 127 MMHG | TEMPERATURE: 98 F | RESPIRATION RATE: 18 BRPM | OXYGEN SATURATION: 99 % | DIASTOLIC BLOOD PRESSURE: 88 MMHG

## 2025-02-13 DIAGNOSIS — J10.1 INFLUENZA A: Primary | ICD-10-CM

## 2025-02-13 LAB
FLUAV AG UPPER RESP QL IA.RAPID: DETECTED
FLUBV AG UPPER RESP QL IA.RAPID: NOT DETECTED
SARS-COV-2 RNA RESP QL NAA+PROBE: NOT DETECTED
STREP A PCR (OHS): NOT DETECTED

## 2025-02-13 PROCEDURE — 0240U COVID/FLU A&B PCR: CPT | Performed by: GENERAL PRACTICE

## 2025-02-13 PROCEDURE — 87651 STREP A DNA AMP PROBE: CPT | Performed by: GENERAL PRACTICE

## 2025-02-13 PROCEDURE — 99283 EMERGENCY DEPT VISIT LOW MDM: CPT

## 2025-02-13 RX ORDER — OSELTAMIVIR PHOSPHATE 75 MG/1
75 CAPSULE ORAL 2 TIMES DAILY
Qty: 10 CAPSULE | Refills: 0 | Status: SHIPPED | OUTPATIENT
Start: 2025-02-13 | End: 2025-02-18

## 2025-02-13 RX ORDER — FLUTICASONE PROPIONATE 50 MCG
1 SPRAY, SUSPENSION (ML) NASAL 2 TIMES DAILY PRN
Qty: 15 G | Refills: 0 | Status: SHIPPED | OUTPATIENT
Start: 2025-02-13

## 2025-02-13 NOTE — ED PROVIDER NOTES
Encounter Date: 2/13/2025       History     Chief Complaint   Patient presents with    Cough     Congestion, nonproductive, sore throat, fever onset yesterday     Congestion, nonproductive, sore throat, fever onset yesterday      The history is provided by the patient.   Cough  This is a new problem. The current episode started yesterday. The problem occurs hourly. The problem has been waxing and waning. The cough is Non-productive. There has been no fever. Associated symptoms include rhinorrhea and sore throat. She has tried nothing for the symptoms.     Review of patient's allergies indicates:   Allergen Reactions    Iodine Swelling    Sulfamethoxazole-trimethoprim Swelling     Past Medical History:   Diagnosis Date    Graves' disease     HTN (hypertension)     Hypothyroidism     Iron deficiency anemia     Thrombocytosis     Thyroid disease      Past Surgical History:   Procedure Laterality Date    DILATION AND CURETTAGE OF UTERUS      THYROIDECTOMY Bilateral 06/29/2018    Dr Radha Hicks     Family History   Problem Relation Name Age of Onset    Hypertension Mother Ever hitchcock     Diabetes Mother Ever hitchcock     Prostate cancer Father Maritza hitchcock     Depression Father Maritza hitchcock     Diabetes Father Maritza hitchcock     Deep vein thrombosis Father Maritza hitchcock      Social History     Tobacco Use    Smoking status: Never    Smokeless tobacco: Never   Substance Use Topics    Alcohol use: Never    Drug use: Never     Review of Systems   Constitutional: Negative.    HENT:  Positive for rhinorrhea and sore throat.    Eyes: Negative.    Respiratory:  Positive for cough.    Cardiovascular: Negative.    Gastrointestinal: Negative.    Endocrine: Negative.    Genitourinary: Negative.    Musculoskeletal: Negative.    Skin: Negative.    Allergic/Immunologic: Negative.    Neurological: Negative.    Hematological: Negative.    Psychiatric/Behavioral: Negative.     All other systems reviewed and are  negative.      Physical Exam     Initial Vitals [02/13/25 1345]   BP Pulse Resp Temp SpO2   129/77 73 18 98.3 °F (36.8 °C) 100 %      MAP       --         Physical Exam    Nursing note and vitals reviewed.  Constitutional: She appears well-developed and well-nourished.   HENT:   Head: Normocephalic and atraumatic.   Eyes: EOM are normal. Pupils are equal, round, and reactive to light.   Neck: Neck supple.   Normal range of motion.  Cardiovascular:  Normal rate, regular rhythm, normal heart sounds and intact distal pulses.           Pulmonary/Chest: Breath sounds normal.   Abdominal: Abdomen is soft. Bowel sounds are normal.   Musculoskeletal:         General: Normal range of motion.      Cervical back: Normal range of motion and neck supple.     Neurological: She is alert and oriented to person, place, and time. She has normal strength. GCS score is 15. GCS eye subscore is 4. GCS verbal subscore is 5. GCS motor subscore is 6.   Skin: Skin is warm and dry.   Psychiatric: She has a normal mood and affect. Her behavior is normal. Judgment and thought content normal.         ED Course   Procedures  Labs Reviewed   COVID/FLU A&B PCR - Abnormal       Result Value    Influenza A PCR Detected (*)     Influenza B PCR Not Detected      SARS-CoV-2 PCR Not Detected      Narrative:     The Xpert Xpress SARS-CoV-2/FLU/RSV plus is a rapid, multiplexed real-time PCR test intended for the simultaneous qualitative detection and differentiation of SARS-CoV-2, Influenza A, Influenza B, and respiratory syncytial virus (RSV) viral RNA in either nasopharyngeal swab or nasal swab specimens.         STREP GROUP A BY PCR - Normal    STREP A PCR ([facility]) Not Detected      Narrative:     The Xpert Xpress Strep A test is a rapid, qualitative in vitro diagnostic test for the detection of Streptococcus pyogenes (Group A ß-hemolytic Streptococcus, Strep A) in throat swab specimens from patients with signs and symptoms of pharyngitis.             Imaging Results    None          Medications - No data to display  Medical Decision Making  Patient has tested positive for the flu.  We will give a prescription for Flonase as well as Tamiflu and have the patient follow up with her primary care provider.    Amount and/or Complexity of Data Reviewed  Labs: ordered.                                      Clinical Impression:  Final diagnoses:  [J10.1] Influenza A (Primary)          ED Disposition Condition    Discharge Stable          ED Prescriptions       Medication Sig Dispense Start Date End Date Auth. Provider    oseltamivir (TAMIFLU) 75 MG capsule Take 1 capsule (75 mg total) by mouth 2 (two) times daily. for 5 days 10 capsule 2/13/2025 2/18/2025 Nico Marin MD    fluticasone propionate (FLONASE) 50 mcg/actuation nasal spray 1 spray (50 mcg total) by Each Nostril route 2 (two) times daily as needed for Rhinitis. 15 g 2/13/2025 -- Nico Marin MD          Follow-up Information       Follow up With Specialties Details Why Contact Info    Peña Morton,  Family Medicine Call in 1 day As needed [address] [facility] [address]  [phone]               Nico Marin MD  02/13/25 8343

## 2025-04-06 ENCOUNTER — HOSPITAL ENCOUNTER (EMERGENCY)
Facility: HOSPITAL | Age: 50
Discharge: HOME | End: 2025-04-06
Attending: GENERAL PRACTICE
Payer: MEDICAID

## 2025-04-06 VITALS
HEART RATE: 70 BPM | SYSTOLIC BLOOD PRESSURE: 151 MMHG | TEMPERATURE: 98 F | HEIGHT: 65 IN | WEIGHT: 198 LBS | RESPIRATION RATE: 18 BRPM | DIASTOLIC BLOOD PRESSURE: 96 MMHG | OXYGEN SATURATION: 100 % | BODY MASS INDEX: 32.99 KG/M2

## 2025-04-06 DIAGNOSIS — M25.522 LEFT ELBOW PAIN: ICD-10-CM

## 2025-04-06 DIAGNOSIS — M77.12 LEFT TENNIS ELBOW: Primary | ICD-10-CM

## 2025-04-06 PROCEDURE — 25000003 PHARM REV CODE 250: Performed by: GENERAL PRACTICE

## 2025-04-06 PROCEDURE — 99283 EMERGENCY DEPT VISIT LOW MDM: CPT | Mod: 25

## 2025-04-06 RX ORDER — NABUMETONE 500 MG/1
500 TABLET, FILM COATED ORAL 2 TIMES DAILY PRN
Qty: 20 TABLET | Refills: 0 | Status: SHIPPED | OUTPATIENT
Start: 2025-04-06

## 2025-04-06 RX ORDER — KETOROLAC TROMETHAMINE 10 MG/1
10 TABLET, FILM COATED ORAL
Status: COMPLETED | OUTPATIENT
Start: 2025-04-06 | End: 2025-04-06

## 2025-04-06 RX ADMIN — KETOROLAC TROMETHAMINE 10 MG: 10 TABLET, FILM COATED ORAL at 08:04

## 2025-04-06 NOTE — ED PROVIDER NOTES
Encounter Date: 4/6/2025       History     Chief Complaint   Patient presents with    Elbow Pain     Left elbow pain starting yesterday.  Denies any injury but does state that she works for [employer] and picks up heavy boxes at work/        Left elbow pain starting yesterday.  Denies any injury but does state that she works for [employer] and picks up heavy boxes at work/      The history is provided by the patient.   Elbow Pain  This is a new problem. The current episode started yesterday. The problem occurs constantly. The problem has been rapidly worsening. The symptoms are aggravated by exertion. Nothing relieves the symptoms. She has tried nothing for the symptoms.     Review of patient's allergies indicates:   Allergen Reactions    Iodine Swelling    Sulfamethoxazole-trimethoprim Swelling     Past Medical History:   Diagnosis Date    Graves' disease     HTN (hypertension)     Hypothyroidism     Iron deficiency anemia     Thrombocytosis     Thyroid disease      Past Surgical History:   Procedure Laterality Date    DILATION AND CURETTAGE OF UTERUS      THYROIDECTOMY Bilateral 06/29/2018    Dr Radha Hicks     Family History   Problem Relation Name Age of Onset    Hypertension Mother Ever hitchcock     Diabetes Mother Ever hitchcock     Prostate cancer Father Maritza hitchcock     Depression Father Maritza hitchcock     Diabetes Father Maritza hitchcock     Deep vein thrombosis Father Maritza hitchcock      Social History[1]  Review of Systems   Constitutional: Negative.    HENT: Negative.     Eyes: Negative.    Respiratory: Negative.     Cardiovascular: Negative.    Gastrointestinal: Negative.    Endocrine: Negative.    Genitourinary: Negative.    Musculoskeletal:  Positive for arthralgias and myalgias.   Skin: Negative.    Allergic/Immunologic: Negative.    Neurological: Negative.    Hematological: Negative.    Psychiatric/Behavioral: Negative.     All other systems reviewed and are negative.      Physical Exam     Initial Vitals  [04/06/25 0826]   BP Pulse Resp Temp SpO2   (!) 151/96 70 18 97.7 °F (36.5 °C) 100 %      MAP       --         Physical Exam    Nursing note and vitals reviewed.  Constitutional: She appears well-developed and well-nourished.   HENT:   Head: Normocephalic and atraumatic.   Eyes: EOM are normal. Pupils are equal, round, and reactive to light.   Neck: Neck supple.   Normal range of motion.  Cardiovascular:  Normal rate, regular rhythm, normal heart sounds and intact distal pulses.           Pulmonary/Chest: Breath sounds normal.   Abdominal: Abdomen is soft. Bowel sounds are normal.   Musculoskeletal:         General: Tenderness present.        Arms:       Cervical back: Normal range of motion and neck supple.      Comments: Tenderness to palpation and movement.  There is mildly limited range of motion secondary to pain.  No crepitus is appreciated and there are no clicks.     Neurological: She is alert and oriented to person, place, and time. She has normal strength. GCS score is 15. GCS eye subscore is 4. GCS verbal subscore is 5. GCS motor subscore is 6.   Skin: Skin is warm and dry.   Psychiatric: She has a normal mood and affect. Her behavior is normal. Judgment and thought content normal.         ED Course   Procedures  Labs Reviewed - No data to display       Imaging Results              X-Ray Elbow Complete Left (Final result)  Result time 04/06/25 09:07:09      Final result by Eb Montanez MD (04/06/25 09:07:09)                   Impression:      No displaced fracture appreciated.  No gross soft tissue abnormality.  No effusion.      Electronically signed by: Eb Montanez  Date:    04/06/2025  Time:    09:07               Narrative:    EXAMINATION:  XR ELBOW COMPLETE 3 VIEW LEFT    CLINICAL HISTORY:  Pain in left elbow    TECHNIQUE:  Three views of the left elbow.    COMPARISON:  No prior imaging available for comparison    FINDINGS:  There is no acute fracture, subluxation or dislocation.    Joints  and interspaces appear maintained.    Osseous structures show normal bone mineral density.    Soft tissues are unremarkable.    There are no radiopaque foreign bodies.                                       Medications   ketorolac tablet 10 mg (10 mg Oral Given 4/6/25 0841)     Medical Decision Making  Nothing abnormal seen on x-ray.  This is likely a tennis elbow of the left elbow given repetitive motion at work.  We will put an Ace wrap and prescribe NSAIDs and have the patient follow up with her primary care provider.    Amount and/or Complexity of Data Reviewed  Radiology: ordered.    Risk  Prescription drug management.                                      Clinical Impression:  Final diagnoses:  [M25.522] Left elbow pain  [M77.12] Left tennis elbow (Primary)          ED Disposition Condition    Discharge Stable          ED Prescriptions       Medication Sig Dispense Start Date End Date Auth. Provider    nabumetone (RELAFEN) 500 MG tablet Take 1 tablet (500 mg total) by mouth 2 (two) times daily as needed for Pain. Take with food 20 tablet 4/6/2025 -- Nico Marin MD          Follow-up Information       Follow up With Specialties Details Why Contact Info    Peña Morton DO Family Medicine Call in 3 days As needed [address] [facility] [address]  [phone]                 [1]   Social History  Tobacco Use    Smoking status: Never    Smokeless tobacco: Never   Substance Use Topics    Alcohol use: Never    Drug use: Never        Nico Marin MD  04/06/25 3103

## 2025-04-06 NOTE — ED NOTES
Pt ambulated to ed rm 4 from [facility]. Aaox4. Reports left elbow pain she thinks from lifting boxes at work. Able to bend elbow. No obvious deformity. In no distress. Wctm